# Patient Record
Sex: FEMALE | Race: BLACK OR AFRICAN AMERICAN | Employment: PART TIME | URBAN - METROPOLITAN AREA
[De-identification: names, ages, dates, MRNs, and addresses within clinical notes are randomized per-mention and may not be internally consistent; named-entity substitution may affect disease eponyms.]

---

## 2020-12-14 ENCOUNTER — OFFICE VISIT (OUTPATIENT)
Dept: PRIMARY CARE CLINIC | Age: 60
End: 2020-12-14

## 2020-12-14 VITALS
HEART RATE: 73 BPM | OXYGEN SATURATION: 96 % | SYSTOLIC BLOOD PRESSURE: 145 MMHG | DIASTOLIC BLOOD PRESSURE: 90 MMHG | TEMPERATURE: 98.5 F | BODY MASS INDEX: 26.84 KG/M2 | WEIGHT: 167 LBS | RESPIRATION RATE: 16 BRPM | HEIGHT: 66 IN

## 2020-12-14 DIAGNOSIS — H40.1130 PRIMARY OPEN ANGLE GLAUCOMA OF BOTH EYES, UNSPECIFIED GLAUCOMA STAGE: ICD-10-CM

## 2020-12-14 DIAGNOSIS — I10 ESSENTIAL HYPERTENSION: Primary | ICD-10-CM

## 2020-12-14 DIAGNOSIS — F51.01 PRIMARY INSOMNIA: ICD-10-CM

## 2020-12-14 DIAGNOSIS — R51.9 FREQUENT HEADACHES: ICD-10-CM

## 2020-12-14 PROCEDURE — 99203 OFFICE O/P NEW LOW 30 MIN: CPT | Performed by: INTERNAL MEDICINE

## 2020-12-14 RX ORDER — LATANOPROST 50 UG/ML
1 SOLUTION/ DROPS OPHTHALMIC
COMMUNITY

## 2020-12-14 RX ORDER — AMLODIPINE AND BENAZEPRIL HYDROCHLORIDE 5; 20 MG/1; MG/1
1 CAPSULE ORAL DAILY
COMMUNITY
End: 2020-12-14 | Stop reason: ALTCHOICE

## 2020-12-14 RX ORDER — LANOLIN ALCOHOL/MO/W.PET/CERES
CREAM (GRAM) TOPICAL
COMMUNITY

## 2020-12-14 RX ORDER — PROPRANOLOL HYDROCHLORIDE 10 MG/1
10 TABLET ORAL 3 TIMES DAILY
Qty: 90 TAB | Refills: 0 | Status: SHIPPED | OUTPATIENT
Start: 2020-12-14 | End: 2021-01-13

## 2020-12-14 NOTE — PROGRESS NOTES
Room: 3    Identified pt with two pt identifiers(name and ). Reviewed record in preparation for visit and have obtained necessary documentation. All patient medications has been reviewed. Chief Complaint   Patient presents with   [address] [facility] Blood Pressure Check       Health Maintenance Due   Topic    Hepatitis C Screening     DTaP/Tdap/Td series (1 - Tdap)    Lipid Screen     Shingrix Vaccine Age 50> (1 of 2)    Colorectal Cancer Screening Combo     Breast Cancer Screen Mammogram     PAP AKA CERVICAL CYTOLOGY     Flu Vaccine (1)     Pap: referral     Mammo: referral     Colorectal screening: overdue     Shingrix: within last year     Tdap: within last year     Flu: decline     Last pcp: Dr. Kyle Lira:    20 1151   BP: (!) 147/81   Pulse: 73   Resp: 16   Temp: 98.5 °F (36.9 °C)   TempSrc: Oral   SpO2: 96%   Weight: 167 lb (75.8 kg)   Height: 5' 6\" (1.676 m)   PainSc:   0 - No pain       4. Have you been to the ER, urgent care clinic since your last visit? Hospitalized since your last visit? No    5. Have you seen or consulted any other health care providers outside of the [address] [place] [address] since your last visit? Include any pap smears or colon screening. No    6. Would you like to receive your flu shot today? NO      Patient is accompanied by self I have received verbal consent from Mikki López to discuss any/all medical information while they are present in the room.

## 2020-12-14 NOTE — PROGRESS NOTES
Written by De Faye, as dictated by Dr. Ashley García MD.    Arabella Gilliland is a 62 y.o. female. HPI  Pt presents today for a BP check. She is taking Lotrel every day, and she has a HA every day. She started Lotrel in early 2019 and switched to lisinopril at the end of 2019 because it felt like Lotrel was too long. She is back on Lotrel now. She has strong fhx of hypertension. Pt's BP is elevated today in office at 147/81, 145/90 on manual repeat in L arm while sitting down. She has recently started going to a gym and working out, doing a squats and burpees and other exercises like this. She inquires if this is safe for her to do if she has high BP and a HA. She has no h/o migraines, and she gets a sharp, pulsing pain in her head that sometimes radiates down into her shoulders and chest. Her HA began in the summer and are not relieved by OTC pain medications. She carries a lot of responsibilities following her mother's death and she works three jobs so she has worries that keep her up at night. She is unsure if this is the source of her high BP and HA or if something else is. She tried melatonin without any difference. She has no h/o cholesterol or anemia problems. She does not have insurance at this time but will have it starting in January. She has glaucoma in her B/L eyes and follows up with her ophthalmologist twice a year. Patient Active Problem List   Diagnosis Code    Primary open angle glaucoma (POAG) of both eyes H40.1130    Essential hypertension I10        Current Outpatient Medications on File Prior to Visit   Medication Sig Dispense Refill    latanoprost (XALATAN) 0.005 % ophthalmic solution Administer 1 Drop to both eyes nightly.  elderberry fruit (ELDERBERRY PO) Take  by mouth.  OTHER seamoss      BURDOCK ROOT PO Take  by mouth.       OTHER Chaga mushroom      ferrous sulfate 325 mg (65 mg iron) tablet Take  by mouth Daily (before breakfast).  [DISCONTINUED] amLODIPine-benazepril (LOTREL) 5-20 mg per capsule Take 1 Cap by mouth daily.  multivitamin (ONE A DAY) tablet Take 1 Tab by mouth daily.  [DISCONTINUED] lisinopril (PRINIVIL, ZESTRIL) 2.5 mg tablet Take 2.5 mg by mouth daily. No current facility-administered medications on file prior to visit.         Allergies   Allergen Reactions    Bactrim [Sulfamethoprim] Nausea and Vomiting       Past Medical History:   Diagnosis Date    Glaucoma     Hypertension        Past Surgical History:   Procedure Laterality Date    HX GYN      hysterectomy    HX HEENT      tonsillectomy    HX TONSILLECTOMY  1967       Family History   Problem Relation Age of Onset    Parkinson's Disease Mother     Emphysema Father     Panic disorder Maternal Grandfather     Alcohol abuse Paternal Grandfather        Social History     Socioeconomic History    Marital status: SINGLE     Spouse name: Not on file    Number of children: Not on file    Years of education: Not on file    Highest education level: Not on file   Occupational History    Not on file   Social Needs    Financial resource strain: Not on file    Food insecurity     Worry: Not on file     Inability: Not on file    Transportation needs     Medical: Not on file     Non-medical: Not on file   Tobacco Use    Smoking status: Never Smoker    Smokeless tobacco: Never Used   Substance and Sexual Activity    Alcohol use: No    Drug use: No    Sexual activity: Not on file   Lifestyle    Physical activity     Days per week: Not on file     Minutes per session: Not on file    Stress: Not on file   Relationships    Social connections     Talks on phone: Not on file     Gets together: Not on file     Attends Scientologist service: Not on file     Active member of club or organization: Not on file     Attends meetings of clubs or organizations: Not on file     Relationship status: Not on file    Intimate partner violence Fear of current or ex partner: Not on file     Emotionally abused: Not on file     Physically abused: Not on file     Forced sexual activity: Not on file   Other Topics Concern    Not on file   Social History Narrative    Not on file       No visits with results within 3 Month(s) from this visit. Latest known visit with results is:   Admission on 04/07/2014, Discharged on 04/08/2014   Component Date Value Ref Range Status    Ventricular Rate 04/07/2014 69  BPM Final    Atrial Rate 04/07/2014 69  BPM Final    P-R Interval 04/07/2014 156  ms Final    QRS Duration 04/07/2014 64  ms Final    Q-T Interval 04/07/2014 388  ms Final    QTC Calculation (Bezet) 04/07/2014 415  ms Final    Calculated P Axis 04/07/2014 37  degrees Final    Calculated R Axis 04/07/2014 -26  degrees Final    Calculated T Axis 04/07/2014 -4  degrees Final    Diagnosis 04/07/2014    Final                    Value:Normal sinus rhythm                          Poor R-wave Progression (consider lead placement or loss of anterior forces)                          No previous ECGs available                          Confirmed by [facility], MD, Tor Artist ([id number]) on 4/8/2014 10:33:57 AM    WBC 04/07/2014 7.9  3.6 - 11.0 K/uL Final    RBC 04/07/2014 4.13  3.80 - 5.20 M/uL Final    HGB 04/07/2014 11.9  11.5 - 16.0 g/dL Final    HCT 04/07/2014 36.9  35.0 - 47.0 % Final    MCV 04/07/2014 89.3  80.0 - 99.0 FL Final    MCH 04/07/2014 28.8  26.0 - 34.0 PG Final    MCHC 04/07/2014 32.2  30.0 - 36.5 g/dL Final    RDW 04/07/2014 14.0  11.5 - 14.5 % Final    PLATELET 65/75/5851 589  150 - 400 K/uL Final    NEUTROPHILS 04/07/2014 44  32 - 75 % Final    LYMPHOCYTES 04/07/2014 46  12 - 49 % Final    MONOCYTES 04/07/2014 7  5 - 13 % Final    EOSINOPHILS 04/07/2014 3  0 - 7 % Final    BASOPHILS 04/07/2014 0  0 - 1 % Final    ABS. NEUTROPHILS 04/07/2014 3.5  1.8 - 8.0 K/UL Final    ABS. LYMPHOCYTES 04/07/2014 3.7* 0.8 - 3.5 K/UL Final    ABS. MONOCYTES 04/07/2014 0.5  0.0 - 1.0 K/UL Final    ABS. EOSINOPHILS 04/07/2014 0.2  0.0 - 0.4 K/UL Final    ABS. BASOPHILS 04/07/2014 0.0  0.0 - 0.1 K/UL Final    Sodium 04/07/2014 142  136 - 145 mmol/L Final    Potassium 04/07/2014 3.6  3.5 - 5.1 mmol/L Final    Chloride 04/07/2014 105  97 - 108 mmol/L Final    CO2 04/07/2014 29  21 - 32 mmol/L Final    Anion gap 04/07/2014 8  5 - 15 mmol/L Final    Glucose 04/07/2014 85  65 - 100 mg/dL Final    BUN 04/07/2014 22* 6 - 20 MG/DL Final    Creatinine 04/07/2014 0.99  0.45 - 1.15 MG/DL Final    BUN/Creatinine ratio 04/07/2014 22* 12 - 20   Final    GFR est AA 04/07/2014 >60  >60 ml/min/1.73m2 Final    GFR est non-AA 04/07/2014 59* >60 ml/min/1.73m2 Final    Comment: Estimated GFR is calculated using the IDMS-traceable Modification of Diet in                            Renal Disease (MDRD) Study equation, reported for both  Americans                            (GFRAA) and non- Americans (GFRNA), and normalized to 1.73m2 body                            surface area. The physician must decide which value applies to the patient. The MDRD study equation should only be used in individuals age 25 or older. It                            has not been validated for the following: pregnant women, patients with                            serious comorbid conditions, or on certain medications, or persons with                            extremes of body size, muscle mass, or nutritional status.  Calcium 04/07/2014 9.3  8.5 - 10.1 MG/DL Final    Bilirubin, total 04/07/2014 0.3  0.2 - 1.0 MG/DL Final    ALT (SGPT) 04/07/2014 22  12 - 78 U/L Final    AST (SGOT) 04/07/2014 14* 15 - 37 U/L Final    Alk.  phosphatase 04/07/2014 96  50 - 136 U/L Final    Protein, total 04/07/2014 8.0  6.4 - 8.2 g/dL Final    Albumin 04/07/2014 3.9  3.5 - 5.0 g/dL Final    Globulin 04/07/2014 4.1* 2.0 - 4.0 g/dL Final    A-G Ratio 04/07/2014 1.0* 1.1 - 2.2   Final    CK 04/07/2014 132  26 - 192 U/L Final    NOT ELEVATED,CKMB-QUANT NOT PERFORMED    Troponin-I, Qt. 04/07/2014 <0.04  <0.05 ng/mL Final    Comment: The presence of detectable troponin above the reference range indicates                            myocardial injury which may be due to ischemia, myocarditis, trauma, etc.                           Clinical correlation is necessary to establish the significance of this                            finding. Sequential testing is recommended to determine if the typical rise and fall of                            cTnI is demonstrated. Note:  Cardiac troponin I has a relatively long half life and may be present                            well after the CK MB has returned to baseline. The reference range is based on the 99th percentile of the referent                            population.  INR 04/07/2014 1.1  0.9 - 1.1   Final    Comment: A single therapeutic range for Vit K antagonists may not be optimal for all                            indications - see June, 2008 issue of Chest, American College of Chest                            Physicians Evidence-Based Clinical Practice Guidelines, 8th Edition.     Prothrombin time 04/07/2014 11.1  9.4 - 11.7 sec Final    aPTT 04/07/2014 27.2  23.0 - 30.0 sec Final    Comment: In addition to factor deficiency, monitoring heparin therapy, etc., evaluation                            of a prolonged aPTT result should include consideration of preanalytic                            variables such as heparin flush contamination, specimen integrity issues, etc.    aPTT, therapeutic range 04/07/2014      58.0 - 77.0 SECS Final    Color 04/08/2014 YELLOW/STRAW   Final    Color Reference Range: Straw, Yellow or Dark Yellow    Appearance 04/08/2014 CLEAR  CLEAR Final    Specific gravity 04/08/2014 1.023  1.003 - 1.030   Final    pH (UA) 04/08/2014 6.0  5.0 - 8.0   Final    Protein 04/08/2014 NEGATIVE   NEGATIVE mg/dL Final    Glucose 04/08/2014 NEGATIVE   NEGATIVE mg/dL Final    Ketone 04/08/2014 NEGATIVE   NEGATIVE mg/dL Final    Bilirubin 04/08/2014 NEGATIVE   NEGATIVE Final    Blood 04/08/2014 NEGATIVE   NEGATIVE Final    Urobilinogen 04/08/2014 0.2  0.2 - 1.0 EU/dL Final    Nitrites 04/08/2014 NEGATIVE   NEGATIVE Final    Leukocyte Esterase 04/08/2014 TRACE* NEGATIVE Final    UA:UC IF INDICATED 04/08/2014 CULTURE NOT INDICATED BY UA RESULT  CULTURE NOT INDICATE Final    WBC 04/08/2014 0-4  0 - 4 /hpf Final    RBC 04/08/2014 0-5  0 - 5 /hpf Final    Epithelial cells 04/08/2014 FEW  FEW /lpf Final    Comment: Epithelial cell category consists of squamous cells and /or transitional                            urothelial cells. Renal tubular cells, if present, are separately identified                            as such.  Bacteria 04/08/2014 NEGATIVE   NEGATIVE /hpf Final    Hyaline cast 04/08/2014 0-2  0 - 2 Final     Review of Systems   Constitutional: Negative for malaise/fatigue and weight loss. HENT: Negative for congestion and sore throat. Eyes: Negative for blurred vision. Respiratory: Negative for cough and shortness of breath. Cardiovascular: Negative for chest pain and leg swelling. Gastrointestinal: Negative for constipation and heartburn. Genitourinary: Negative for frequency and urgency. Musculoskeletal: Negative for back pain, joint pain and myalgias. Neurological: Positive for headaches. Negative for dizziness. Psychiatric/Behavioral: Negative for depression. The patient has insomnia. The patient is not nervous/anxious. Visit Vitals  BP (!) 145/90 (BP 1 Location: Left arm, BP Patient Position: Sitting)   Pulse 73   Temp 98.5 °F (36.9 °C) (Oral)   Resp 16   Ht 5' 6\" (1.676 m)   Wt 167 lb (75.8 kg)   SpO2 96%   BMI 26.95 kg/m²     Physical Exam  Vitals signs and nursing note reviewed. Constitutional:       General: She is not in acute distress. Appearance: Normal appearance. She is well-developed and well-groomed. She is not diaphoretic. HENT:      Right Ear: External ear normal.      Left Ear: External ear normal.   Eyes:      General: No scleral icterus. Right eye: No discharge. Left eye: No discharge. Extraocular Movements: Extraocular movements intact. Neck:      Musculoskeletal: Normal range of motion and neck supple. Cardiovascular:      Rate and Rhythm: Normal rate and regular rhythm. Pulmonary:      Effort: Pulmonary effort is normal.      Breath sounds: Normal breath sounds. No wheezing. Musculoskeletal:      Right lower leg: No edema. Left lower leg: No edema. Lymphadenopathy:      Cervical: No cervical adenopathy. Neurological:      Mental Status: She is alert and oriented to person, place, and time. Psychiatric:         Mood and Affect: Mood and affect normal.         Behavior: Behavior normal.       ASSESSMENT and PLAN    ICD-10-CM ICD-9-CM    1. Essential hypertension  I10 401.9 propranoloL (INDERAL) 10 mg tablet sent to pharmacy. Potential side effects were discussed. I started her on propranolol and explained that it will help for her BP and HA at the same time. I instructed her to take 10 mg BID. She should see her HA start to decrease and her BP start to come down within 5-6 days. If her BP is still running high, she can increase to TID. Explained that the possible sfx can include a slower heart rate or dizziness. 2. Frequent headaches  R51.9 784.0 propranoloL (INDERAL) 10 mg tablet sent to pharmacy. Potential side effects were discussed. I started her on propranolol and explained that it will help for her BP and HA at the same time. I instructed her to take 10 mg BID. She should see her HA start to decrease and her BP start to come down within 5-6 days. If her BP is still running high, she can increase to TID. Explained that the possible sfx can include a slower heart rate or dizziness. 3. Primary open angle glaucoma of both eyes, unspecified glaucoma stage  H40.1130 365.11 Pt follows with ophthalmology. 365.70    4. Primary insomnia  F51.01 307.42 I suggested she start with home remedies for sleep such as Valerian root, tart cherry juice, or Unisom for her insomnia. We discussed that the evening dose of propranolol will help some too. Additional comments: I instructed her not to do intense workouts when she has     This plan was reviewed with the patient and patient agrees. All questions were answered. This scribe documentation was reviewed by me and accurately reflects the examination and decisions made by me.

## 2020-12-29 ENCOUNTER — OFFICE VISIT (OUTPATIENT)
Dept: PRIMARY CARE CLINIC | Age: 60
End: 2020-12-29

## 2020-12-29 VITALS
RESPIRATION RATE: 18 BRPM | BODY MASS INDEX: 27.03 KG/M2 | OXYGEN SATURATION: 97 % | SYSTOLIC BLOOD PRESSURE: 136 MMHG | WEIGHT: 168.2 LBS | HEIGHT: 66 IN | DIASTOLIC BLOOD PRESSURE: 88 MMHG | TEMPERATURE: 97.8 F | HEART RATE: 63 BPM

## 2020-12-29 DIAGNOSIS — R51.9 FREQUENT HEADACHES: ICD-10-CM

## 2020-12-29 DIAGNOSIS — I10 ESSENTIAL HYPERTENSION: Primary | ICD-10-CM

## 2020-12-29 DIAGNOSIS — H40.1130 PRIMARY OPEN ANGLE GLAUCOMA OF BOTH EYES, UNSPECIFIED GLAUCOMA STAGE: ICD-10-CM

## 2020-12-29 DIAGNOSIS — M81.0 POSTMENOPAUSAL BONE LOSS: ICD-10-CM

## 2020-12-29 DIAGNOSIS — Z12.11 COLON CANCER SCREENING: ICD-10-CM

## 2020-12-29 DIAGNOSIS — Z12.31 ENCOUNTER FOR SCREENING MAMMOGRAM FOR MALIGNANT NEOPLASM OF BREAST: ICD-10-CM

## 2020-12-29 DIAGNOSIS — Z11.59 NEED FOR HEPATITIS C SCREENING TEST: ICD-10-CM

## 2020-12-29 PROCEDURE — 99214 OFFICE O/P EST MOD 30 MIN: CPT | Performed by: INTERNAL MEDICINE

## 2020-12-29 RX ORDER — ACETAZOLAMIDE 125 MG/1
250 TABLET ORAL 2 TIMES DAILY
Qty: 120 TAB | Refills: 0 | Status: SHIPPED | OUTPATIENT
Start: 2020-12-29 | End: 2021-03-03

## 2020-12-29 RX ORDER — AMLODIPINE AND BENAZEPRIL HYDROCHLORIDE 5; 20 MG/1; MG/1
1 CAPSULE ORAL DAILY
COMMUNITY
End: 2021-03-22 | Stop reason: ALTCHOICE

## 2020-12-29 NOTE — PROGRESS NOTES
Written by Cass Johnston, as dictated by Dr. Tori Martinez MD.    Altagracia Frost is a 62 y.o. female. HPI  Pt presents today for a BP check. Her BP looks good in office today at 131/87 and she is taking Lotrel 5-20 mg every day but forgot to take it this morning. However, she is still getting HA frequently. She gets her glaucoma checked every 6 months, and the pressure in her eyes was 12 and 13 last time she went. She has not discussed Diamox with her ophthalmologist yet. She had her mammogram done this year, but she had it done at [facility]. She had a total hysterectomy at the age of 33 and has not had a menstrual cycle since then. She follows with a gynecologist but has never had a DEXA scan. Her has colonoscopy was 10 years ago, and she is due for her next one. Patient Active Problem List   Diagnosis Code    Primary open angle glaucoma (POAG) of both eyes H40.1130    Essential hypertension I10        Current Outpatient Medications on File Prior to Visit   Medication Sig Dispense Refill    amLODIPine-benazepril (LOTREL) 5-20 mg per capsule Take 1 Cap by mouth daily.  latanoprost (XALATAN) 0.005 % ophthalmic solution Administer 1 Drop to both eyes nightly.  elderberry fruit (ELDERBERRY PO) Take  by mouth.  OTHER seamoss      BURDOCK ROOT PO Take  by mouth.  OTHER Chaga mushroom      ferrous sulfate 325 mg (65 mg iron) tablet Take  by mouth Daily (before breakfast).  multivitamin (ONE A DAY) tablet Take 1 Tab by mouth daily.  propranoloL (INDERAL) 10 mg tablet Take 1 Tab by mouth three (3) times daily for 30 days. 90 Tab 0     No current facility-administered medications on file prior to visit.         Allergies   Allergen Reactions    Bactrim [Sulfamethoprim] Nausea and Vomiting       Past Medical History:   Diagnosis Date    Glaucoma     Hypertension        Past Surgical History:   Procedure Laterality Date    HX GYN      hysterectomy    HX HEENT      tonsillectomy    HX TONSILLECTOMY  1967       Family History   Problem Relation Age of Onset    Parkinson's Disease Mother     Emphysema Father     Panic disorder Maternal Grandfather     Alcohol abuse Paternal Grandfather        Social History     Socioeconomic History    Marital status: SINGLE     Spouse name: Not on file    Number of children: Not on file    Years of education: Not on file    Highest education level: Not on file   Occupational History    Not on file   Social Needs    Financial resource strain: Not on file    Food insecurity     Worry: Not on file     Inability: Not on file    Transportation needs     Medical: Not on file     Non-medical: Not on file   Tobacco Use    Smoking status: Never Smoker    Smokeless tobacco: Never Used   Substance and Sexual Activity    Alcohol use: No    Drug use: No    Sexual activity: Not on file   Lifestyle    Physical activity     Days per week: Not on file     Minutes per session: Not on file    Stress: Not on file   Relationships    Social connections     Talks on phone: Not on file     Gets together: Not on file     Attends Rastafarian service: Not on file     Active member of club or organization: Not on file     Attends meetings of clubs or organizations: Not on file     Relationship status: Not on file    Intimate partner violence     Fear of current or ex partner: Not on file     Emotionally abused: Not on file     Physically abused: Not on file     Forced sexual activity: Not on file   Other Topics Concern    Not on file   Social History Narrative    Not on file       No visits with results within 3 Month(s) from this visit.    Latest known visit with results is:   Admission on 04/07/2014, Discharged on 04/08/2014   Component Date Value Ref Range Status    Ventricular Rate 04/07/2014 69  BPM Final    Atrial Rate 04/07/2014 69  BPM Final    P-R Interval 04/07/2014 156  ms Final    QRS Duration 04/07/2014 64  ms Final    Q-T Interval 04/07/2014 388  ms Final    QTC Calculation (Bezet) 04/07/2014 415  ms Final    Calculated P Axis 04/07/2014 37  degrees Final    Calculated R Axis 04/07/2014 -26  degrees Final    Calculated T Axis 04/07/2014 -4  degrees Final    Diagnosis 04/07/2014    Final                    Value:Normal sinus rhythm                          Poor R-wave Progression (consider lead placement or loss of anterior forces)                          No previous ECGs available                          Confirmed by Bronwyn Cabrera MD, Krystal Woodson ([id number]) on 4/8/2014 10:33:57 AM    WBC 04/07/2014 7.9  3.6 - 11.0 K/uL Final    RBC 04/07/2014 4.13  3.80 - 5.20 M/uL Final    HGB 04/07/2014 11.9  11.5 - 16.0 g/dL Final    HCT 04/07/2014 36.9  35.0 - 47.0 % Final    MCV 04/07/2014 89.3  80.0 - 99.0 FL Final    MCH 04/07/2014 28.8  26.0 - 34.0 PG Final    MCHC 04/07/2014 32.2  30.0 - 36.5 g/dL Final    RDW 04/07/2014 14.0  11.5 - 14.5 % Final    PLATELET 13/96/4577 384  150 - 400 K/uL Final    NEUTROPHILS 04/07/2014 44  32 - 75 % Final    LYMPHOCYTES 04/07/2014 46  12 - 49 % Final    MONOCYTES 04/07/2014 7  5 - 13 % Final    EOSINOPHILS 04/07/2014 3  0 - 7 % Final    BASOPHILS 04/07/2014 0  0 - 1 % Final    ABS. NEUTROPHILS 04/07/2014 3.5  1.8 - 8.0 K/UL Final    ABS. LYMPHOCYTES 04/07/2014 3.7* 0.8 - 3.5 K/UL Final    ABS. MONOCYTES 04/07/2014 0.5  0.0 - 1.0 K/UL Final    ABS. EOSINOPHILS 04/07/2014 0.2  0.0 - 0.4 K/UL Final    ABS.  BASOPHILS 04/07/2014 0.0  0.0 - 0.1 K/UL Final    Sodium 04/07/2014 142  136 - 145 mmol/L Final    Potassium 04/07/2014 3.6  3.5 - 5.1 mmol/L Final    Chloride 04/07/2014 105  97 - 108 mmol/L Final    CO2 04/07/2014 29  21 - 32 mmol/L Final    Anion gap 04/07/2014 8  5 - 15 mmol/L Final    Glucose 04/07/2014 85  65 - 100 mg/dL Final    BUN 04/07/2014 22* 6 - 20 MG/DL Final    Creatinine 04/07/2014 0.99  0.45 - 1.15 MG/DL Final    BUN/Creatinine ratio 04/07/2014 22* 12 - 20   Final    GFR est AA 04/07/2014 >60  >60 ml/min/1.73m2 Final    GFR est non-AA 04/07/2014 59* >60 ml/min/1.73m2 Final    Comment: Estimated GFR is calculated using the IDMS-traceable Modification of Diet in                            Renal Disease (MDRD) Study equation, reported for both  Americans                            (GFRAA) and non- Americans (GFRNA), and normalized to 1.73m2 body                            surface area. The physician must decide which value applies to the patient. The MDRD study equation should only be used in individuals age 25 or older. It                            has not been validated for the following: pregnant women, patients with                            serious comorbid conditions, or on certain medications, or persons with                            extremes of body size, muscle mass, or nutritional status.  Calcium 04/07/2014 9.3  8.5 - 10.1 MG/DL Final    Bilirubin, total 04/07/2014 0.3  0.2 - 1.0 MG/DL Final    ALT (SGPT) 04/07/2014 22  12 - 78 U/L Final    AST (SGOT) 04/07/2014 14* 15 - 37 U/L Final    Alk. phosphatase 04/07/2014 96  50 - 136 U/L Final    Protein, total 04/07/2014 8.0  6.4 - 8.2 g/dL Final    Albumin 04/07/2014 3.9  3.5 - 5.0 g/dL Final    Globulin 04/07/2014 4.1* 2.0 - 4.0 g/dL Final    A-G Ratio 04/07/2014 1.0* 1.1 - 2.2   Final    CK 04/07/2014 132  26 - 192 U/L Final    NOT ELEVATED,CKMB-QUANT NOT PERFORMED    Troponin-I, Qt. 04/07/2014 <0.04  <0.05 ng/mL Final    Comment: The presence of detectable troponin above the reference range indicates                            myocardial injury which may be due to ischemia, myocarditis, trauma, etc.                           Clinical correlation is necessary to establish the significance of this                            finding.                            Sequential testing is recommended to determine if the typical rise and fall of                            cTnI is demonstrated. Note:  Cardiac troponin I has a relatively long half life and may be present                            well after the CK MB has returned to baseline. The reference range is based on the 99th percentile of the referent                            population.  INR 04/07/2014 1.1  0.9 - 1.1   Final    Comment: A single therapeutic range for Vit K antagonists may not be optimal for all                            indications - see June, 2008 issue of Chest, American College of Chest                            Physicians Evidence-Based Clinical Practice Guidelines, 8th Edition.     Prothrombin time 04/07/2014 11.1  9.4 - 11.7 sec Final    aPTT 04/07/2014 27.2  23.0 - 30.0 sec Final    Comment: In addition to factor deficiency, monitoring heparin therapy, etc., evaluation                            of a prolonged aPTT result should include consideration of preanalytic                            variables such as heparin flush contamination, specimen integrity issues, etc.    aPTT, therapeutic range 04/07/2014      58.0 - 77.0 SECS Final    Color 04/08/2014 YELLOW/STRAW   Final    Color Reference Range: Straw, Yellow or Dark Yellow    Appearance 04/08/2014 CLEAR  CLEAR Final    Specific gravity 04/08/2014 1.023  1.003 - 1.030   Final    pH (UA) 04/08/2014 6.0  5.0 - 8.0   Final    Protein 04/08/2014 NEGATIVE   NEGATIVE mg/dL Final    Glucose 04/08/2014 NEGATIVE   NEGATIVE mg/dL Final    Ketone 04/08/2014 NEGATIVE   NEGATIVE mg/dL Final    Bilirubin 04/08/2014 NEGATIVE   NEGATIVE Final    Blood 04/08/2014 NEGATIVE   NEGATIVE Final    Urobilinogen 04/08/2014 0.2  0.2 - 1.0 EU/dL Final    Nitrites 04/08/2014 NEGATIVE   NEGATIVE Final    Leukocyte Esterase 04/08/2014 TRACE* NEGATIVE Final    UA:UC IF INDICATED 04/08/2014 CULTURE NOT INDICATED BY UA RESULT  CULTURE NOT INDICATE Final    WBC 04/08/2014 0-4  0 - 4 /hpf Final    RBC 04/08/2014 0-5  0 - 5 /hpf Final    Epithelial cells 04/08/2014 FEW  FEW /lpf Final    Comment: Epithelial cell category consists of squamous cells and /or transitional                            urothelial cells. Renal tubular cells, if present, are separately identified                            as such.  Bacteria 04/08/2014 NEGATIVE   NEGATIVE /hpf Final    Hyaline cast 04/08/2014 0-2  0 - 2 Final     Review of Systems   Constitutional: Negative for malaise/fatigue and weight loss. HENT: Negative for congestion and sore throat. Eyes: Negative for blurred vision. Respiratory: Negative for cough and shortness of breath. Cardiovascular: Negative for chest pain and leg swelling. Gastrointestinal: Negative for constipation and heartburn. Genitourinary: Negative for frequency and urgency. Musculoskeletal: Negative for back pain, joint pain and myalgias. Neurological: Positive for headaches (frequent). Negative for dizziness. Psychiatric/Behavioral: Negative for depression. The patient is not nervous/anxious and does not have insomnia. Visit Vitals  /88 (BP 1 Location: Right arm, BP Patient Position: Sitting)   Pulse 63   Temp 97.8 °F (36.6 °C) (Temporal)   Resp 18   Ht 5' 6\" (1.676 m)   Wt 168 lb 3.2 oz (76.3 kg)   SpO2 97%   BMI 27.15 kg/m²     Physical Exam  Vitals signs and nursing note reviewed. Constitutional:       General: She is not in acute distress. Appearance: Normal appearance. She is well-developed and well-groomed. She is not diaphoretic. HENT:      Right Ear: External ear normal.      Left Ear: External ear normal.   Eyes:      General: No scleral icterus. Right eye: No discharge. Left eye: No discharge. Extraocular Movements: Extraocular movements intact. Neck:      Musculoskeletal: Normal range of motion and neck supple. Cardiovascular:      Rate and Rhythm: Normal rate and regular rhythm.    Pulmonary: Effort: Pulmonary effort is normal.      Breath sounds: Normal breath sounds. No wheezing. Musculoskeletal:      Right lower leg: No edema. Left lower leg: No edema. Lymphadenopathy:      Cervical: No cervical adenopathy. Neurological:      Mental Status: She is alert and oriented to person, place, and time. Psychiatric:         Mood and Affect: Mood and affect normal.         Behavior: Behavior normal.       ASSESSMENT and PLAN    ICD-10-CM ICD-9-CM    1. Essential hypertension  Q59 513.9 METABOLIC PANEL, COMPREHENSIVE      CBC W/O DIFF      LIPID PANEL    Ordered fasting labs for pt to complete today in office. Waiting on results. acetaZOLAMIDE (DIAMOX) 125 mg tablet sent to pharmacy. Potential side effects were discussed. I instructed her to take 1 tablet BID first and see how her BP is and how she is feeling. If her BP is running high, she can increase to taking 2 tablets BID. Discontinue Lotrel. 2. Primary open angle glaucoma of both eyes, unspecified glaucoma stage  H40.1130 365.11 acetaZOLAMIDE (DIAMOX) 125 mg tablet sent to pharmacy. Potential side effects were discussed. We discussed that Diamox helps with both BP and glaucoma. I instructed her to contact her ophthalmologist to let them know about this new medication. 365.70    3. Frequent headaches  R51.9 784.0 SED RATE (ESR)      BENNY, DIRECT, W/REFLEX      acetaZOLAMIDE (DIAMOX) 125 mg tablet sent to pharmacy. Potential side effects were discussed. We discussed that Diamox may help her HA if they are coming from intracranial pressure. I ordered labs to evaluate for other underlying causes as well. 4. Need for hepatitis C screening test  Z11.59 V73.89 HEPATITIS C AB    Labs drawn in office today. 5. Encounter for screening mammogram for malignant neoplasm of breast  Z12.31 V76.12 [facility] 3D VICTORIANO W MAMMO BI SCREENING INCL CAD    I ordered a mammogram for health maintenance.      6. Postmenopausal bone loss M81.0 733.01 DEXA BONE DENSITY STUDY AXIAL    I ordered a DEXA scan for health maintenance. 7. Colon cancer screening  Z12.11 V76.51 REFERRAL TO GASTROENTEROLOGY    I provided a referral to gastroenterology for her colonoscopy. This plan was reviewed with the patient and patient agrees. All questions were answered. This scribe documentation was reviewed by me and accurately reflects the examination and decisions made by me.

## 2021-01-13 DIAGNOSIS — M34.9 SCLERODERMA (HCC): Primary | ICD-10-CM

## 2021-03-03 DIAGNOSIS — R51.9 FREQUENT HEADACHES: ICD-10-CM

## 2021-03-03 DIAGNOSIS — I10 ESSENTIAL HYPERTENSION: ICD-10-CM

## 2021-03-03 DIAGNOSIS — H40.1130 PRIMARY OPEN ANGLE GLAUCOMA OF BOTH EYES, UNSPECIFIED GLAUCOMA STAGE: ICD-10-CM

## 2021-03-03 RX ORDER — ACETAZOLAMIDE 125 MG/1
TABLET ORAL
Qty: 120 TAB | Refills: 0 | Status: SHIPPED | OUTPATIENT
Start: 2021-03-03 | End: 2021-03-04

## 2021-03-04 DIAGNOSIS — I10 ESSENTIAL HYPERTENSION: ICD-10-CM

## 2021-03-04 DIAGNOSIS — R51.9 FREQUENT HEADACHES: ICD-10-CM

## 2021-03-04 DIAGNOSIS — H40.1130 PRIMARY OPEN ANGLE GLAUCOMA OF BOTH EYES, UNSPECIFIED GLAUCOMA STAGE: ICD-10-CM

## 2021-03-04 RX ORDER — ACETAZOLAMIDE 125 MG/1
TABLET ORAL
Qty: 120 TAB | Refills: 0 | Status: SHIPPED | OUTPATIENT
Start: 2021-03-04 | End: 2021-06-30

## 2021-03-22 ENCOUNTER — OFFICE VISIT (OUTPATIENT)
Dept: RHEUMATOLOGY | Age: 61
End: 2021-03-22

## 2021-03-22 VITALS
RESPIRATION RATE: 18 BRPM | TEMPERATURE: 96.8 F | BODY MASS INDEX: 27 KG/M2 | SYSTOLIC BLOOD PRESSURE: 155 MMHG | DIASTOLIC BLOOD PRESSURE: 99 MMHG | HEIGHT: 66 IN | WEIGHT: 168 LBS | HEART RATE: 61 BPM

## 2021-03-22 DIAGNOSIS — Z72.820 POOR SLEEP: ICD-10-CM

## 2021-03-22 DIAGNOSIS — R76.8 FALSE POSITIVE ANA: Primary | ICD-10-CM

## 2021-03-22 DIAGNOSIS — G44.219 EPISODIC TENSION-TYPE HEADACHE, NOT INTRACTABLE: ICD-10-CM

## 2021-03-22 DIAGNOSIS — M54.2 MUSCULOSKELETAL NECK PAIN: ICD-10-CM

## 2021-03-22 PROCEDURE — 99204 OFFICE O/P NEW MOD 45 MIN: CPT | Performed by: INTERNAL MEDICINE
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
## 2021-03-22 NOTE — LETTER
3/22/2021 Patient: Lois Velazquez YOB: 1960 Date of Visit: 3/22/2021 Marykay Bence, MD 
[address] [place] [id number] [address] [employer] Dear Marykay Bence, MD, Thank you for referring Ms. Chasity Juárez to [facility] for evaluation. My notes for this consultation are attached. If you have questions, please do not hesitate to call me. I look forward to following your patient along with you.  
 
 
Sincerely, 
 
Radha Herrera MD

## 2021-03-22 NOTE — PATIENT INSTRUCTIONS
Poor Sleep. Please discuss with Dr. Darío Palacios to assist you with your sleep issues. Musculoskeletal Neck Pain. The etiology is usually from chronic slouching due to reading, smart phone use, video onel, and/or computer work. This will cause muscular tension and spasms, which may result with referred headaches. This is not an inflammatory process nor is it an immune mediated condition. It is mechanical and should be treated with targeted physical therapy with the goal of realigning the neck and shoulder girdle into the normal anatomic alignment. Muscle relaxants and analgesics should be used as supportive treatment. I recommend physical therapy. Neck: Exercises Introduction Here are some examples of exercises for you to try. The exercises may be suggested for a condition or for rehabilitation. Start each exercise slowly. Ease off the exercises if you start to have pain. You will be told when to start these exercises and which ones will work best for you. How to do the exercises Neck stretch 1. This stretch works best if you keep your shoulder down as you lean away from it. To help you remember to do this, start by relaxing your shoulders and lightly holding on to your thighs or your chair. 2. Tilt your head toward your shoulder and hold for 15 to 30 seconds. Let the weight of your head stretch your muscles. 3. If you would like a little added stretch, use your hand to gently and steadily pull your head toward your shoulder. For example, keeping your right shoulder down, lean your head to the left. 4. Repeat 2 to 4 times toward each shoulder. Diagonal neck stretch 1. Turn your head slightly toward the direction you will be stretching, and tilt your head diagonally toward your chest and hold for 15 to 30 seconds. 2. If you would like a little added stretch, use your hand to gently and steadily pull your head forward on the diagonal. 
3. Repeat 2 to 4 times toward each side.    
Dorsal glide stretch The dorsal glide stretches the back of the neck. If you feel pain, do not glide so far back. Some people find this exercise easier to do while lying on their backs with an ice pack on the neck. 1. Sit or stand tall and look straight ahead. 2. Slowly tuck your chin as you glide your head backward over your body 3. Hold for a count of 6, and then relax for up to 10 seconds. 4. Repeat 8 to 12 times. Chest and shoulder stretch 1. Sit or stand tall and glide your head backward as in the dorsal glide stretch. 2. Raise both arms so that your hands are next to your ears. 3. Take a deep breath, and as you breathe out, lower your elbows down and behind your back. You will feel your shoulder blades slide down and together, and at the same time you will feel a stretch across your chest and the front of your shoulders. 4. Hold for about 6 seconds, and then relax for up to 10 seconds. 5. Repeat 8 to 12 times. Strengthening: Hands on head 1. Move your head backward, forward, and side to side against gentle pressure from your hands, holding each position for about 6 seconds. 2. Repeat 8 to 12 times. Follow-up care is a key part of your treatment and safety. Be sure to make and go to all appointments, and call your doctor if you are having problems. It's also a good idea to know your test results and keep a list of the medicines you take. Where can you learn more? Go to http://www.[employer].com/ Enter P975 in the search box to learn more about \"Neck: Exercises. \" Current as of: March 2, 2020               Content Version: 12.6 © 3140-9507 [employer]. Care instructions adapted under license by [employer] (which disclaims liability or warranty for this information).  If you have questions about a medical condition or this instruction, always ask your healthcare professional. [address] any warranty or liability for your use of this information.

## 2021-06-30 DIAGNOSIS — R51.9 FREQUENT HEADACHES: ICD-10-CM

## 2021-06-30 DIAGNOSIS — H40.1130 PRIMARY OPEN ANGLE GLAUCOMA OF BOTH EYES, UNSPECIFIED GLAUCOMA STAGE: ICD-10-CM

## 2021-06-30 DIAGNOSIS — I10 ESSENTIAL HYPERTENSION: ICD-10-CM

## 2021-06-30 RX ORDER — ACETAZOLAMIDE 125 MG/1
TABLET ORAL
Qty: 120 TABLET | Refills: 0 | Status: SHIPPED | OUTPATIENT
Start: 2021-06-30 | End: 2021-08-09

## 2021-08-09 DIAGNOSIS — H40.1130 PRIMARY OPEN ANGLE GLAUCOMA OF BOTH EYES, UNSPECIFIED GLAUCOMA STAGE: ICD-10-CM

## 2021-08-09 DIAGNOSIS — I10 ESSENTIAL HYPERTENSION: ICD-10-CM

## 2021-08-09 DIAGNOSIS — R51.9 FREQUENT HEADACHES: ICD-10-CM

## 2021-08-09 RX ORDER — ACETAZOLAMIDE 125 MG/1
TABLET ORAL
Qty: 120 TABLET | Refills: 0 | Status: SHIPPED | OUTPATIENT
Start: 2021-08-09 | End: 2021-09-10

## 2021-10-15 DIAGNOSIS — H40.1130 PRIMARY OPEN ANGLE GLAUCOMA OF BOTH EYES, UNSPECIFIED GLAUCOMA STAGE: ICD-10-CM

## 2021-10-15 DIAGNOSIS — R51.9 FREQUENT HEADACHES: ICD-10-CM

## 2021-10-15 DIAGNOSIS — I10 ESSENTIAL HYPERTENSION: ICD-10-CM

## 2021-10-15 RX ORDER — ACETAZOLAMIDE 125 MG/1
TABLET ORAL
Qty: 120 TABLET | Refills: 0 | Status: SHIPPED | OUTPATIENT
Start: 2021-10-15 | End: 2021-11-16

## 2021-10-25 ENCOUNTER — OFFICE VISIT (OUTPATIENT)
Dept: PRIMARY CARE CLINIC | Age: 61
End: 2021-10-25

## 2021-10-25 VITALS
WEIGHT: 163.2 LBS | HEART RATE: 55 BPM | TEMPERATURE: 97.3 F | DIASTOLIC BLOOD PRESSURE: 82 MMHG | OXYGEN SATURATION: 99 % | RESPIRATION RATE: 15 BRPM | HEIGHT: 66 IN | SYSTOLIC BLOOD PRESSURE: 140 MMHG | BODY MASS INDEX: 26.23 KG/M2

## 2021-10-25 DIAGNOSIS — Z86.69 HISTORY OF GLAUCOMA: ICD-10-CM

## 2021-10-25 DIAGNOSIS — N95.0 POSTMENOPAUSAL VAGINAL BLEEDING: ICD-10-CM

## 2021-10-25 DIAGNOSIS — R51.9 FREQUENT HEADACHES: ICD-10-CM

## 2021-10-25 DIAGNOSIS — I10 UNCONTROLLED HYPERTENSION: Primary | ICD-10-CM

## 2021-10-25 PROCEDURE — 99214 OFFICE O/P EST MOD 30 MIN: CPT | Performed by: INTERNAL MEDICINE

## 2021-10-25 RX ORDER — AMLODIPINE BESYLATE 5 MG/1
5 TABLET ORAL DAILY
Qty: 30 TABLET | Refills: 2 | Status: SHIPPED | OUTPATIENT
Start: 2021-10-25 | End: 2022-01-15

## 2021-10-25 NOTE — PROGRESS NOTES
Chief Complaint   Patient presents with    Follow-up     BP follow up       Visit Vitals  BP (!) 145/83 (BP 1 Location: Left arm)   Pulse (!) 55   Temp 97.3 °F (36.3 °C)   Resp 15   Ht 5' 6\" (1.676 m)   Wt 163 lb 3.2 oz (74 kg)   SpO2 99%   BMI 26.34 kg/m²       1. Have you been to the ER, urgent care clinic since your last visit? Hospitalized since your last visit? No    2. Have you seen or consulted any other health care providers outside of the [address] [place] [address] since your last visit? Include any pap smears or colon screening.  Yes GYN- was given flucanazole because she thought she had infection, pain during sex

## 2021-10-25 NOTE — PROGRESS NOTES
Anup Lobato (: 1960) is a 62 y.o. female, established patient, here for evaluation of the following chief complaint(s):  Follow-up (BP follow up)     Written by Antoinette Riddle, as dictated by Dr. Waldemar Live MD.      ASSESSMENT/PLAN:  Below is the assessment and plan developed based on review of pertinent history, physical exam, labs, studies, and medications. 1. Uncontrolled hypertension  Continue taking Diamox 125 mg as prescribed. Prescribed amlodipine 5 mg, take 1 tab daily as prescribed. Potential side effects were discussed. -     amLODIPine (NORVASC) 5 mg tablet; Take 1 Tablet by mouth daily for 30 days. , Normal, Disp-30 Tablet, R-2 sent to pharmacy. 2. Frequent headaches  Secondary to uncontrolled hypertension. Continue taking Diamox 125 mg as prescribed. Prescribed amlodipine 5 mg, take 1 tab daily as prescribed. Potential side effects were discussed. -     amLODIPine (NORVASC) 5 mg tablet; Take 1 Tablet by mouth daily for 30 days. , Normal, Disp-30 Tablet, R-2 sent to pharmacy. 3. Postmenopausal vaginal bleeding  Advised her to make an appointment with her OBGYN in [place] to discuss vaginal bleeding. Explained postmenopausal bleeding is worrisome     4. History of glaucoma  Continue taking Diamox 125 mg as prescribed. Discussed with Ophthalmologist about headaches, could be due to Glaucoma. SUBJECTIVE/OBJECTIVE:  HPI    Patient presents today for a follow up visit. She takes Diamox 125 mg for HTN and hx of glaucoma. Her BP today is 145/83, 140/82 manual repeat. She c/o headaches everyday. She does not check her BP at home. She recently had sexual intercourse in August and she had vaginal bleeding. Her OBGYN is located in [place] so that's why she made an appointment with a local NP OBGYN. She saw an NP OBGYN but it was mainly an establish care visit, they did not really evaluate the vaginal bleeding.      Patient Active Problem List   Diagnosis Code    Primary open angle glaucoma (POAG) of both eyes H40.1130    Essential hypertension I10        Current Outpatient Medications on File Prior to Visit   Medication Sig Dispense Refill    acetaZOLAMIDE (DIAMOX) 125 mg tablet Take 2 tablets by mouth twice daily 120 Tablet 0    latanoprost (XALATAN) 0.005 % ophthalmic solution Administer 1 Drop to both eyes nightly.  OTHER seamoss      BURDOCK ROOT PO Take  by mouth.  ferrous sulfate 325 mg (65 mg iron) tablet Take  by mouth Daily (before breakfast). (Patient not taking: Reported on 10/25/2021)       No current facility-administered medications on file prior to visit.        Allergies   Allergen Reactions    Bactrim [Sulfamethoprim] Nausea and Vomiting       Past Medical History:   Diagnosis Date    Chronic tension headaches     Glaucoma     Hypertension        Past Surgical History:   Procedure Laterality Date    HX GYN      hysterectomy    HX TONSILLECTOMY  1967       Family History   Problem Relation Age of Onset    Parkinson's Disease Mother     Emphysema Father     Panic disorder Maternal Grandfather     Alcohol abuse Paternal Grandfather     Diabetes Sister     No Known Problems Brother     Thyroid Disease Brother        Social History     Socioeconomic History    Marital status: SINGLE     Spouse name: Not on file    Number of children: Not on file    Years of education: Not on file    Highest education level: Not on file   Occupational History    Not on file   Tobacco Use    Smoking status: Never Smoker    Smokeless tobacco: Never Used   Vaping Use    Vaping Use: Never used   Substance and Sexual Activity    Alcohol use: No    Drug use: No    Sexual activity: Not Currently   Other Topics Concern    Not on file   Social History Narrative    Not on file     Social Determinants of Health     Financial Resource Strain:     Difficulty of Paying Living Expenses:    Food Insecurity:     Worried About Running Out of [employer] in the Last Year:    [address] in the Last Year:    Transportation Needs:     Lack of Transportation (Medical):  Lack of Transportation (Non-Medical):    Physical Activity:     Days of Exercise per Week:     Minutes of Exercise per Session:    Stress:     Feeling of Stress :    Social Connections:     Frequency of Communication with Friends and Family:     Frequency of Social Gatherings with Friends and Family:     Attends [employer] Services:     Active Member of Clubs or Organizations:     Attends Club or Organization Meetings:     Marital Status:    Intimate Partner Violence:     Fear of Current or Ex-Partner:     Emotionally Abused:     Physically Abused:     Sexually Abused:        No visits with results within 3 Month(s) from this visit. Latest known visit with results is:   Orders Only on 01/08/2021   Component Date Value Ref Range Status    BENNY, Direct 01/08/2021 Positive* Negative   Final    Comment: (NOTE)  Performed At: [address] [id number]  Sheryle Sicks MD TC:[id number]      Sed rate, automated 01/08/2021 22  0 - 30 mm/hr Final    Hep C virus Ab Interp. 01/08/2021 NONREACTIVE  NONREACTIVE   Final    Hep C  virus Ab comment 01/08/2021 Method used is TipRanks    Final    LIPID PROFILE 01/08/2021        Final    Cholesterol, total 01/08/2021 171  <200 MG/DL Final    Triglyceride 01/08/2021 57  <150 MG/DL Final    Comment: Based on NCEP-ATP III:  Triglycerides <150 mg/dL  is considered normal, 150-199  mg/dL  borderline high,  200-499 mg/dL high and  greater than or equal to 500  mg/dL very high.  HDL Cholesterol 01/08/2021 65  MG/DL Final    Comment: Based on NCEP ATP III, HDL Cholesterol <40 mg/dL is considered low and >60  mg/dL is elevated.       LDL, calculated 01/08/2021 94.6  0 - 100 MG/DL Final    Comment: Based on the NCEP-ATP: LDL-C concentrations are considered  optimal <100 mg/dL,  near optimal/above Normal 100-129 mg/dL Borderline High: 130-159, High: 160-189  mg/dL Very High: Greater than or equal to 190 mg/dL      VLDL, calculated 01/08/2021 11.4  MG/DL Final    CHOL/HDL Ratio 01/08/2021 2.6  0.0 - 5.0   Final    WBC 01/08/2021 5.0  3.6 - 11.0 K/uL Final    RBC 01/08/2021 4.30  3.80 - 5.20 M/uL Final    HGB 01/08/2021 12.8  11.5 - 16.0 g/dL Final    HCT 01/08/2021 40.2  35.0 - 47.0 % Final    MCV 01/08/2021 93.5  80.0 - 99.0 FL Final    MCH 01/08/2021 29.8  26.0 - 34.0 PG Final    MCHC 01/08/2021 31.8  30.0 - 36.5 g/dL Final    RDW 01/08/2021 13.8  11.5 - 14.5 % Final    PLATELET 75/04/2326 469  150 - 400 K/uL Final    MPV 01/08/2021 12.5  8.9 - 12.9 FL Final    NRBC 01/08/2021 0.0  0  WBC Final    ABSOLUTE NRBC 01/08/2021 0.00  0.00 - 0.01 K/uL Final    Sodium 01/08/2021 142  136 - 145 mmol/L Final    Potassium 01/08/2021 3.8  3.5 - 5.1 mmol/L Final    Chloride 01/08/2021 108  97 - 108 mmol/L Final    CO2 01/08/2021 28  21 - 32 mmol/L Final    Anion gap 01/08/2021 6  5 - 15 mmol/L Final    Glucose 01/08/2021 97  65 - 100 mg/dL Final    BUN 01/08/2021 15  6 - 20 MG/DL Final    Creatinine 01/08/2021 0.90  0.55 - 1.02 MG/DL Final    BUN/Creatinine ratio 01/08/2021 17  12 - 20   Final    GFR est AA 01/08/2021 >60  >60 ml/min/1.73m2 Final    GFR est non-AA 01/08/2021 >60  >60 ml/min/1.73m2 Final    Comment: Estimated GFR is calculated using the IDMS-traceable Modification of Diet in  Renal Disease (MDRD) Study equation, reported for both  Americans  (GFRAA) and non- Americans (GFRNA), and normalized to 1.73m2 body  surface area. The physician must decide which value applies to the patient.  Calcium 01/08/2021 9.3  8.5 - 10.1 MG/DL Final    Bilirubin, total 01/08/2021 0.5  0.2 - 1.0 MG/DL Final    ALT (SGPT) 01/08/2021 20  12 - 78 U/L Final    AST (SGOT) 01/08/2021 14* 15 - 37 U/L Final    Alk.  phosphatase 01/08/2021 134* 45 - 117 U/L Final    Protein, total 2021 7.5  6.4 - 8.2 g/dL Final    Albumin 2021 3.9  3.5 - 5.0 g/dL Final    Globulin 2021 3.6  2.0 - 4.0 g/dL Final    A-G Ratio 2021 1.1  1.1 - 2.2   Final    Anti-DNA (DS) Ab, QT 2021 <1  0 - 9 IU/mL Final    Comment: (NOTE)                                    Negative      <5                                    Equivocal  5 - 9                                    Positive      >9      RNP Abs 2021 <0.2  0.0 - 0.9 AI Final    Rizzo Abs 2021 <0.2  0.0 - 0.9 AI Final    Scleroderma-70 Ab 2021 2.5* 0.0 - 0.9 AI Final    Sjogren's Anti-SS-A 2021 <0.2  0.0 - 0.9 AI Final    Sjogren's Anti-SS-B 2021 <0.2  0.0 - 0.9 AI Final    Antichromatin Ab 2021 <0.2  0.0 - 0.9 AI Final    Anti-Carrie-1 2021 <0.2  0.0 - 0.9 AI Final    Centromere B Ab 2021 <0.2  0.0 - 0.9 AI Final    Comment: (NOTE)  Performed At: [address] [id number]  Theo Gatica MD SQ:[id number]      See below 2021 Comment    Final    Comment: (NOTE)  Autoantibody                       Disease Association  ------------------------------------------------------------                         Condition                  Frequency  ---------------------   ------------------------   ---------  Antinuclear Antibody,    SLE, mixed connective  Direct (BENNY-D)           tissue diseases  ---------------------   ------------------------   ---------  dsDNA                    SLE                        40 - 60%  ---------------------   ------------------------   ---------  Chromatin                Drug induced SLE                90%                          SLE                        48 - 97%  ---------------------   ------------------------   ---------  SSA (Ro)                 SLE                        25 - 35%                          Sjogren's Syndrome         40 - 70%                           Lupus 100%  ---------------------   ------------------------   ---------  SSB (La)                 SLE                                                        10%                          Sjogren's Syndrome              30%  ---------------------   -----------------------    ---------  Sm (anti-Smith)          SLE                        15 - 30%  ---------------------   -----------------------    ---------  RNP                      Mixed Connective Tissue                          Disease                         95%  (U1 nRNP,                SLE                        30 - 50%  anti-ribonucleoprotein)  Polymyositis and/or                          Dermatomyositis                 20%  ---------------------   ------------------------   ---------  Scl-70 (antiDNA          Scleroderma (diffuse)      20 - 35%  topoisomerase)           Crest                           13%  ---------------------   ------------------------   ---------  Carrie-1                     Polymyositis and/or                          Dermatomyositis            20 - 40%  ---------------------   ------------------------   ---------  Centromere B             Scleroderma -                            Crest                          variant                         80%  Performed At: [address] [id number]  Rosie Hall MD DN:[id number]         Review of Systems   Constitutional: Negative for activity change, fatigue and unexpected weight change. HENT: Negative for congestion, hearing loss, rhinorrhea and sore throat. Eyes: Negative for discharge. Respiratory: Negative for cough, chest tightness and shortness of breath. Cardiovascular: Negative for leg swelling. Gastrointestinal: Negative for abdominal pain, constipation and diarrhea. Genitourinary: Positive for vaginal bleeding. Negative for dysuria, flank pain, frequency and urgency. Musculoskeletal: Negative for arthralgias, back pain and myalgias.    Skin: Negative for color change and rash. Neurological: Positive for headaches. Negative for dizziness and light-headedness. Psychiatric/Behavioral: Negative for dysphoric mood and sleep disturbance. The patient is not nervous/anxious. Visit Vitals  BP (!) 140/82 (BP 1 Location: Right arm)   Pulse (!) 55   Temp 97.3 °F (36.3 °C)   Resp 15   Ht 5' 6\" (1.676 m)   Wt 163 lb 3.2 oz (74 kg)   SpO2 99%   BMI 26.34 kg/m²       Physical Exam  Vitals and nursing note reviewed. Constitutional:       General: She is not in acute distress. Appearance: Normal appearance. She is well-developed. She is not diaphoretic. HENT:      Right Ear: External ear normal.      Left Ear: External ear normal.   Eyes:      General: No scleral icterus. Right eye: No discharge. Left eye: No discharge. Extraocular Movements: Extraocular movements intact. Conjunctiva/sclera: Conjunctivae normal.   Cardiovascular:      Rate and Rhythm: Normal rate and regular rhythm. Pulmonary:      Effort: Pulmonary effort is normal.      Breath sounds: Normal breath sounds. No wheezing. Abdominal:      General: Bowel sounds are normal.      Palpations: Abdomen is soft. Tenderness: There is no abdominal tenderness. Musculoskeletal:      Cervical back: Normal range of motion and neck supple. Lymphadenopathy:      Cervical: No cervical adenopathy. Neurological:      Mental Status: She is alert and oriented to person, place, and time. Psychiatric:         Mood and Affect: Mood and affect normal.             An electronic signature was used to authenticate this note.   -- [employer]

## 2021-11-16 DIAGNOSIS — R51.9 FREQUENT HEADACHES: ICD-10-CM

## 2021-11-16 DIAGNOSIS — H40.1130 PRIMARY OPEN ANGLE GLAUCOMA OF BOTH EYES, UNSPECIFIED GLAUCOMA STAGE: ICD-10-CM

## 2021-11-16 DIAGNOSIS — I10 ESSENTIAL HYPERTENSION: ICD-10-CM

## 2021-11-16 RX ORDER — ACETAZOLAMIDE 125 MG/1
TABLET ORAL
Qty: 120 TABLET | Refills: 0 | Status: SHIPPED | OUTPATIENT
Start: 2021-11-16 | End: 2021-12-17

## 2021-12-17 DIAGNOSIS — I10 ESSENTIAL HYPERTENSION: ICD-10-CM

## 2021-12-17 DIAGNOSIS — R51.9 FREQUENT HEADACHES: ICD-10-CM

## 2021-12-17 DIAGNOSIS — H40.1130 PRIMARY OPEN ANGLE GLAUCOMA OF BOTH EYES, UNSPECIFIED GLAUCOMA STAGE: ICD-10-CM

## 2021-12-17 RX ORDER — ACETAZOLAMIDE 125 MG/1
TABLET ORAL
Qty: 120 TABLET | Refills: 0 | Status: SHIPPED | OUTPATIENT
Start: 2021-12-17 | End: 2022-01-15

## 2022-01-15 DIAGNOSIS — I10 UNCONTROLLED HYPERTENSION: ICD-10-CM

## 2022-01-15 DIAGNOSIS — I10 ESSENTIAL HYPERTENSION: ICD-10-CM

## 2022-01-15 DIAGNOSIS — R51.9 FREQUENT HEADACHES: ICD-10-CM

## 2022-01-15 DIAGNOSIS — H40.1130 PRIMARY OPEN ANGLE GLAUCOMA OF BOTH EYES, UNSPECIFIED GLAUCOMA STAGE: ICD-10-CM

## 2022-01-15 RX ORDER — ACETAZOLAMIDE 125 MG/1
TABLET ORAL
Qty: 120 TABLET | Refills: 0 | Status: SHIPPED | OUTPATIENT
Start: 2022-01-15 | End: 2022-02-22

## 2022-01-15 RX ORDER — AMLODIPINE BESYLATE 5 MG/1
TABLET ORAL
Qty: 30 TABLET | Refills: 0 | Status: SHIPPED | OUTPATIENT
Start: 2022-01-15 | End: 2022-02-22

## 2022-02-22 DIAGNOSIS — I10 ESSENTIAL HYPERTENSION: ICD-10-CM

## 2022-02-22 DIAGNOSIS — I10 UNCONTROLLED HYPERTENSION: ICD-10-CM

## 2022-02-22 DIAGNOSIS — R51.9 FREQUENT HEADACHES: ICD-10-CM

## 2022-02-22 DIAGNOSIS — H40.1130 PRIMARY OPEN ANGLE GLAUCOMA OF BOTH EYES, UNSPECIFIED GLAUCOMA STAGE: ICD-10-CM

## 2022-02-22 RX ORDER — ACETAZOLAMIDE 125 MG/1
TABLET ORAL
Qty: 120 TABLET | Refills: 0 | Status: SHIPPED | OUTPATIENT
Start: 2022-02-22 | End: 2022-03-29

## 2022-02-22 RX ORDER — AMLODIPINE BESYLATE 5 MG/1
TABLET ORAL
Qty: 30 TABLET | Refills: 0 | Status: SHIPPED | OUTPATIENT
Start: 2022-02-22 | End: 2022-03-29

## 2022-03-18 PROBLEM — I10 ESSENTIAL HYPERTENSION: Status: ACTIVE | Noted: 2020-12-14

## 2022-03-19 PROBLEM — H40.1130 PRIMARY OPEN ANGLE GLAUCOMA (POAG) OF BOTH EYES: Status: ACTIVE | Noted: 2020-12-14

## 2022-03-29 DIAGNOSIS — I10 ESSENTIAL HYPERTENSION: ICD-10-CM

## 2022-03-29 DIAGNOSIS — H40.1130 PRIMARY OPEN ANGLE GLAUCOMA OF BOTH EYES, UNSPECIFIED GLAUCOMA STAGE: ICD-10-CM

## 2022-03-29 DIAGNOSIS — R51.9 FREQUENT HEADACHES: ICD-10-CM

## 2022-03-29 DIAGNOSIS — I10 UNCONTROLLED HYPERTENSION: ICD-10-CM

## 2022-03-29 RX ORDER — ACETAZOLAMIDE 125 MG/1
TABLET ORAL
Qty: 120 TABLET | Refills: 0 | Status: SHIPPED | OUTPATIENT
Start: 2022-03-29 | End: 2022-04-28

## 2022-03-29 RX ORDER — AMLODIPINE BESYLATE 5 MG/1
TABLET ORAL
Qty: 30 TABLET | Refills: 0 | Status: SHIPPED | OUTPATIENT
Start: 2022-03-29 | End: 2022-04-28

## 2022-04-12 ENCOUNTER — OFFICE VISIT (OUTPATIENT)
Dept: PRIMARY CARE CLINIC | Age: 62
End: 2022-04-12
Payer: COMMERCIAL

## 2022-04-12 VITALS
TEMPERATURE: 98.5 F | HEART RATE: 69 BPM | HEIGHT: 66 IN | WEIGHT: 168 LBS | OXYGEN SATURATION: 99 % | DIASTOLIC BLOOD PRESSURE: 80 MMHG | BODY MASS INDEX: 27 KG/M2 | SYSTOLIC BLOOD PRESSURE: 130 MMHG

## 2022-04-12 DIAGNOSIS — K21.9 GASTROESOPHAGEAL REFLUX DISEASE WITHOUT ESOPHAGITIS: ICD-10-CM

## 2022-04-12 DIAGNOSIS — Z12.31 ENCOUNTER FOR SCREENING MAMMOGRAM FOR MALIGNANT NEOPLASM OF BREAST: ICD-10-CM

## 2022-04-12 DIAGNOSIS — H40.1130 PRIMARY OPEN ANGLE GLAUCOMA OF BOTH EYES, UNSPECIFIED GLAUCOMA STAGE: ICD-10-CM

## 2022-04-12 DIAGNOSIS — I10 PRIMARY HYPERTENSION: Primary | ICD-10-CM

## 2022-04-12 DIAGNOSIS — M35.9 AUTOIMMUNE DISEASE (HCC): ICD-10-CM

## 2022-04-12 DIAGNOSIS — Z12.11 COLON CANCER SCREENING: ICD-10-CM

## 2022-04-12 PROCEDURE — 99213 OFFICE O/P EST LOW 20 MIN: CPT | Performed by: INTERNAL MEDICINE

## 2022-04-12 NOTE — PROGRESS NOTES
Thai Sanchez (: 1960) is a 65 y.o. female, established patient, here for evaluation of the following chief complaint(s):  Follow-up and Blood Pressure Check     Written by Enma Calle, as dictated by Dr. Malena Rizzo MD.      ASSESSMENT/PLAN:  Below is the assessment and plan developed based on review of pertinent history, physical exam, labs, studies, and medications. 1. Primary hypertension  BP is well controlled on amlodipine 5 mg daily. Continue current medication(s). 2. Primary open angle glaucoma of both eyes, unspecified glaucoma stage  Followed by Ophthalmology. Continue on Diamox 125 mg BID. 3. Encounter for screening mammogram for malignant neoplasm of breast  Ordered screening mammogram.  -     IAN 3D VICTORIANO W MAMMO BI SCREENING INCL CAD; Future    4. Colon cancer screening  I provided her with a referral to Dr. Brent Gandara (GI).   -     REFERRAL TO GASTROENTEROLOGY    5. Autoimmune disease ([address]  Stable at this time. She has been evaluated by Dr. Naveen Smiley (rheumatology). Can consider further workup if her fatigue does not improve. 6. Gastroesophageal reflux disease without esophagitis  Recommend she have an EGD and colonoscopy at the same time and can discuss this with Dr. Brent Gandara. SUBJECTIVE/OBJECTIVE:  HPI   The patient presents today for a routine follow-up of chronic conditions. She is currently doing a morning show on the radio in [place], but still lives in [place]. She mentions feeling more fatigued recently. She goes to the gym regularly and usually feels good after workouts. Her BP today is good at 125/83, 130/80 on manual repeat. She is on amlodipine 5 mg daily for HTN. She gets occasional headaches, but does not check her BP at that time. She does not check her BP regularly at home. She is followed by Ophthalmology for open angle glaucoma of both eyes. She has a follow-up in May 2022. She continues on Diamox 125 mg BID.      She was told she had reflux had a previous ED visit and rx'd omeprazole, but she has not been taking this. She has an appt with Dr. Joceline Lara (OB/GYN) on 06/08/22. Patient Active Problem List   Diagnosis Code    Primary open angle glaucoma (POAG) of both eyes H40.1130    Essential hypertension I10    Gastroesophageal reflux disease without esophagitis K21.9        Current Outpatient Medications on File Prior to Visit   Medication Sig Dispense Refill    cholecalciferol, vitamin D3, (VITAMIN D3 PO) Take  by mouth.  ZINC PO Take  by mouth.  amLODIPine (NORVASC) 5 mg tablet TAKE 1 TABLET BY MOUTH ONE TIME DAILY 30 Tablet 0    acetaZOLAMIDE (DIAMOX) 125 mg tablet TAKE TWO TABLETS BY MOUTH TWICE DAILY 120 Tablet 0    latanoprost (XALATAN) 0.005 % ophthalmic solution Administer 1 Drop to both eyes nightly.  OTHER seamoss      BURDOCK ROOT PO Take  by mouth.  ferrous sulfate 325 mg (65 mg iron) tablet Take  by mouth Daily (before breakfast). (Patient not taking: Reported on 10/25/2021)       No current facility-administered medications on file prior to visit.        Allergies   Allergen Reactions    Bactrim [Sulfamethoprim] Nausea and Vomiting       Past Medical History:   Diagnosis Date    Chronic tension headaches     Glaucoma     Hypertension        Past Surgical History:   Procedure Laterality Date    HX GYN      hysterectomy    HX TONSILLECTOMY  1967       Family History   Problem Relation Age of Onset    Parkinson's Disease Mother     Emphysema Father     Panic disorder Maternal Grandfather     Alcohol abuse Paternal Grandfather     Diabetes Sister     No Known Problems Brother     Thyroid Disease Brother        Social History     Socioeconomic History    Marital status: SINGLE     Spouse name: Not on file    Number of children: Not on file    Years of education: Not on file    Highest education level: Not on file   Occupational History    Not on file   Tobacco Use    Smoking status: Never Smoker    Smokeless tobacco: Never Used   Vaping Use    Vaping Use: Never used   Substance and Sexual Activity    Alcohol use: No    Drug use: No    Sexual activity: Not Currently   Other Topics Concern    Not on file   Social History Narrative    Not on file       No visits with results within 3 Month(s) from this visit. Latest known visit with results is:   Orders Only on 01/08/2021   Component Date Value Ref Range Status    BENNY, Direct 01/08/2021 Positive* Negative   Final    Comment: (NOTE)  Performed At: [facility] & [address] [id number]  Juanjo Garcia MD [id number]      Sed rate, automated 01/08/2021 22  0 - 30 mm/hr Final    Hep C virus Ab Interp. 01/08/2021 NONREACTIVE  NONREACTIVE   Final    Hep C  virus Ab comment 01/08/2021 Method used is Spongecell    Final    LIPID PROFILE 01/08/2021        Final    Cholesterol, total 01/08/2021 171  <200 MG/DL Final    Triglyceride 01/08/2021 57  <150 MG/DL Final    Comment: Based on NCEP-ATP III:  Triglycerides <150 mg/dL  is considered normal, 150-199  mg/dL  borderline high,  200-499 mg/dL high and  greater than or equal to 500  mg/dL very high.  HDL Cholesterol 01/08/2021 65  MG/DL Final    Comment: Based on NCEP ATP III, HDL Cholesterol <40 mg/dL is considered low and >60  mg/dL is elevated.       LDL, calculated 01/08/2021 94.6  0 - 100 MG/DL Final    Comment: Based on the NCEP-ATP: LDL-C concentrations are considered  optimal <100 mg/dL,  near optimal/above Normal 100-129 mg/dL Borderline High: 130-159, High: 160-189  mg/dL Very High: Greater than or equal to 190 mg/dL      VLDL, calculated 01/08/2021 11.4  MG/DL Final    CHOL/HDL Ratio 01/08/2021 2.6  0.0 - 5.0   Final    WBC 01/08/2021 5.0  3.6 - 11.0 K/uL Final    RBC 01/08/2021 4.30  3.80 - 5.20 M/uL Final    HGB 01/08/2021 12.8  11.5 - 16.0 g/dL Final    HCT 01/08/2021 40.2  35.0 - 47.0 % Final    MCV 01/08/2021 93.5  80.0 - 99.0 FL Final    MCH 01/08/2021 29.8  26.0 - 34.0 PG Final    MCHC 01/08/2021 31.8  30.0 - 36.5 g/dL Final    RDW 01/08/2021 13.8  11.5 - 14.5 % Final    PLATELET 67/78/0708 592  150 - 400 K/uL Final    MPV 01/08/2021 12.5  8.9 - 12.9 FL Final    NRBC 01/08/2021 0.0  0  WBC Final    ABSOLUTE NRBC 01/08/2021 0.00  0.00 - 0.01 K/uL Final    Sodium 01/08/2021 142  136 - 145 mmol/L Final    Potassium 01/08/2021 3.8  3.5 - 5.1 mmol/L Final    Chloride 01/08/2021 108  97 - 108 mmol/L Final    CO2 01/08/2021 28  21 - 32 mmol/L Final    Anion gap 01/08/2021 6  5 - 15 mmol/L Final    Glucose 01/08/2021 97  65 - 100 mg/dL Final    BUN 01/08/2021 15  6 - 20 MG/DL Final    Creatinine 01/08/2021 0.90  0.55 - 1.02 MG/DL Final    BUN/Creatinine ratio 01/08/2021 17  12 - 20   Final    GFR est AA 01/08/2021 >60  >60 ml/min/1.73m2 Final    GFR est non-AA 01/08/2021 >60  >60 ml/min/1.73m2 Final    Comment: Estimated GFR is calculated using the IDMS-traceable Modification of Diet in  Renal Disease (MDRD) Study equation, reported for both  Americans  (GFRAA) and non- Americans (GFRNA), and normalized to 1.73m2 body  surface area. The physician must decide which value applies to the patient.  Calcium 01/08/2021 9.3  8.5 - 10.1 MG/DL Final    Bilirubin, total 01/08/2021 0.5  0.2 - 1.0 MG/DL Final    ALT (SGPT) 01/08/2021 20  12 - 78 U/L Final    AST (SGOT) 01/08/2021 14* 15 - 37 U/L Final    Alk.  phosphatase 01/08/2021 134* 45 - 117 U/L Final    Protein, total 01/08/2021 7.5  6.4 - 8.2 g/dL Final    Albumin 01/08/2021 3.9  3.5 - 5.0 g/dL Final    Globulin 01/08/2021 3.6  2.0 - 4.0 g/dL Final    A-G Ratio 01/08/2021 1.1  1.1 - 2.2   Final    Anti-DNA (DS) Ab, QT 01/08/2021 <1  0 - 9 IU/mL Final    Comment: (NOTE)                                    Negative      <5                                    Equivocal  5 - 9                                    Positive      >9      RNP Abs 01/08/2021 <0.2 0.0 - 0.9 AI Final    Rizzo Abs 2021 <0.2  0.0 - 0.9 AI Final    Scleroderma-70 Ab 2021 2.5* 0.0 - 0.9 AI Final    Sjogren's Anti-SS-A 2021 <0.2  0.0 - 0.9 AI Final    Sjogren's Anti-SS-B 2021 <0.2  0.0 - 0.9 AI Final    Antichromatin Ab 2021 <0.2  0.0 - 0.9 AI Final    Anti-Carrie-1 2021 <0.2  0.0 - 0.9 AI Final    Centromere B Ab 2021 <0.2  0.0 - 0.9 AI Final    Comment: (NOTE)  Performed At: [facility] & [address] [id number]  Terese Hays MD [id number]      See below 2021 Comment    Final    Comment: (NOTE)  Autoantibody                       Disease Association  ------------------------------------------------------------                         Condition                  Frequency  ---------------------   ------------------------   ---------  Antinuclear Antibody,    SLE, mixed connective  Direct (BENNY-D)           tissue diseases  ---------------------   ------------------------   ---------  dsDNA                    SLE                        40 - 60%  ---------------------   ------------------------   ---------  Chromatin                Drug induced SLE                90%                          SLE                        48 - 97%  ---------------------   ------------------------   ---------  SSA (Ro)                 SLE                        25 - 35%                          Sjogren's Syndrome         40 - 70%                           Lupus                 100%  ---------------------   ------------------------   ---------  SSB (La)                 SLE                                                        10%                          Sjogren's Syndrome              30%  ---------------------   -----------------------    ---------  Sm (anti-Smith)          SLE                        15 - 30%  ---------------------   -----------------------    ---------  RNP                      Mixed Connective Tissue Disease                         95%  (U1 nRNP,                SLE                        30 - 50%  anti-ribonucleoprotein)  Polymyositis and/or                          Dermatomyositis                 20%  ---------------------   ------------------------   ---------  Scl-70 (antiDNA          Scleroderma (diffuse)      20 - 35%  topoisomerase)           Crest                           13%  ---------------------   ------------------------   ---------  Carrie-1                     Polymyositis and/or                          Dermatomyositis            20 - 40%  ---------------------   ------------------------   ---------  Centromere B             Scleroderma -                            Crest                          variant                         80%  Performed At: [address] [id number]  Aj Finch MD W        Review of Systems   Constitutional: Positive for fatigue. Negative for activity change and unexpected weight change. HENT: Negative for congestion, hearing loss, rhinorrhea and sore throat. Eyes: Negative for discharge. Respiratory: Negative for cough, chest tightness and shortness of breath. Cardiovascular: Negative for leg swelling. Gastrointestinal: Negative for abdominal pain, constipation and diarrhea. Genitourinary: Negative for dysuria, flank pain, frequency and urgency. Musculoskeletal: Negative for arthralgias, back pain and myalgias. Skin: Negative for color change and rash. Neurological: Negative for dizziness, light-headedness and headaches. Psychiatric/Behavioral: Negative for dysphoric mood and sleep disturbance. The patient is not nervous/anxious. Visit Vitals  /80 (BP 1 Location: Left arm, BP Patient Position: Sitting)   Pulse 69   Temp 98.5 °F (36.9 °C) (Temporal)   Ht 5' 6\" (1.676 m)   Wt 168 lb (76.2 kg)   SpO2 99%   BMI 27.12 kg/m²      Physical Exam  Vitals and nursing note reviewed. Constitutional:       General: She is not in acute distress. Appearance: Normal appearance. She is well-developed. She is not diaphoretic. HENT:      Right Ear: External ear normal.      Left Ear: External ear normal.   Eyes:      General: No scleral icterus. Right eye: No discharge. Left eye: No discharge. Extraocular Movements: Extraocular movements intact. Conjunctiva/sclera: Conjunctivae normal.   Cardiovascular:      Rate and Rhythm: Normal rate and regular rhythm. Pulmonary:      Effort: Pulmonary effort is normal.      Breath sounds: Normal breath sounds. No wheezing. Abdominal:      General: Bowel sounds are normal.      Palpations: Abdomen is soft. Tenderness: There is no abdominal tenderness. Musculoskeletal:      Cervical back: Normal range of motion and neck supple. Lymphadenopathy:      Cervical: No cervical adenopathy. Neurological:      Mental Status: She is alert and oriented to person, place, and time. Psychiatric:         Mood and Affect: Mood and affect normal.       An electronic signature was used to authenticate this note.   -- Nigel Flavio

## 2022-04-12 NOTE — PROGRESS NOTES
Identified pt with two pt identifiers. Reviewed record in preparation for visit and have obtained necessary documentation. All patient medications has been reviewed. Chief Complaint   Patient presents with    Follow-up    Blood Pressure Check     Additional information about chief complaint:    Visit Vitals  /83 (BP 1 Location: Left upper arm, BP Patient Position: Sitting, BP Cuff Size: Large adult)   Pulse 69   Temp 98.5 °F (36.9 °C) (Temporal)   Ht 5' 6\" (1.676 m)   Wt 168 lb (76.2 kg)   SpO2 99%   BMI 27.12 kg/m²       Health Maintenance Due   Topic    DTaP/Tdap/Td series (1 - Tdap)    Colorectal Cancer Screening Combo     Shingrix Vaccine Age 50> (1 of 2)    Breast Cancer Screen Mammogram     COVID-19 Vaccine (3 - Booster for NUVETA series)       1. Have you been to the ER, urgent care clinic since your last visit? Hospitalized since your last visit? No    2. Have you seen or consulted any other health care providers outside of the [address] [place] [address] since your last visit? Include any pap smears or colon screening.  No

## 2022-04-28 DIAGNOSIS — H40.1130 PRIMARY OPEN ANGLE GLAUCOMA OF BOTH EYES, UNSPECIFIED GLAUCOMA STAGE: ICD-10-CM

## 2022-04-28 DIAGNOSIS — R51.9 FREQUENT HEADACHES: ICD-10-CM

## 2022-04-28 DIAGNOSIS — I10 UNCONTROLLED HYPERTENSION: ICD-10-CM

## 2022-04-28 DIAGNOSIS — I10 ESSENTIAL HYPERTENSION: ICD-10-CM

## 2022-04-28 RX ORDER — ACETAZOLAMIDE 125 MG/1
TABLET ORAL
Qty: 120 TABLET | Refills: 0 | Status: SHIPPED | OUTPATIENT
Start: 2022-04-28 | End: 2022-06-06

## 2022-04-28 RX ORDER — AMLODIPINE BESYLATE 5 MG/1
TABLET ORAL
Qty: 30 TABLET | Refills: 0 | Status: SHIPPED | OUTPATIENT
Start: 2022-04-28 | End: 2022-05-30

## 2022-06-06 DIAGNOSIS — I10 ESSENTIAL HYPERTENSION: ICD-10-CM

## 2022-06-06 DIAGNOSIS — H40.1130 PRIMARY OPEN ANGLE GLAUCOMA OF BOTH EYES, UNSPECIFIED GLAUCOMA STAGE: ICD-10-CM

## 2022-06-06 DIAGNOSIS — R51.9 FREQUENT HEADACHES: ICD-10-CM

## 2022-06-06 RX ORDER — ACETAZOLAMIDE 125 MG/1
TABLET ORAL
Qty: 120 TABLET | Refills: 0 | Status: SHIPPED | OUTPATIENT
Start: 2022-06-06 | End: 2022-07-15

## 2022-06-08 ENCOUNTER — OFFICE VISIT (OUTPATIENT)
Dept: OBGYN CLINIC | Age: 62
End: 2022-06-08
Payer: COMMERCIAL

## 2022-06-08 VITALS
SYSTOLIC BLOOD PRESSURE: 138 MMHG | DIASTOLIC BLOOD PRESSURE: 86 MMHG | HEIGHT: 66 IN | WEIGHT: 167 LBS | BODY MASS INDEX: 26.84 KG/M2

## 2022-06-08 DIAGNOSIS — Z01.419 ENCOUNTER FOR GYNECOLOGICAL EXAMINATION WITHOUT ABNORMAL FINDING: Primary | ICD-10-CM

## 2022-06-08 PROCEDURE — 99386 PREV VISIT NEW AGE 40-64: CPT | Performed by: OBSTETRICS & GYNECOLOGY

## 2022-06-08 RX ORDER — ESTRADIOL 10 UG/1
10 INSERT VAGINAL
Qty: 24 TABLET | Refills: 4 | Status: SHIPPED | OUTPATIENT
Start: 2022-06-09

## 2022-06-08 NOTE — PATIENT INSTRUCTIONS

## 2022-06-08 NOTE — PROGRESS NOTES
Annual exam    Artis Burton is a 65 y.o. postmenopausal female presenting for annual exam. Her main concerns today include having a general gyn exam. She states she has vaginal discomfort with exams and intercourse, had one episode of post-coital bleeding last August. Has not had intercourse since that time. Still occasional hot flashes. She declines a chaperone during the gynecologic exam today. Ob/Gyn Hx:  G0  Menopause age - LAURA hyst 47 - thinks ovaries were also removed.     PMB - one episode of post-coital spotting last August  Menopausal symptoms - vaginal discomfort  HRT - no  Sexually active - not currently    Health maintenance:  Pap- n/a  Mammo- scheduled  Colonoscopy- set up    Past Medical History:   Diagnosis Date    Chronic tension headaches     Glaucoma     Hypertension        Past Surgical History:   Procedure Laterality Date    HX GYN      hysterectomy    HX TONSILLECTOMY  1967       Family History   Problem Relation Age of Onset    Parkinson's Disease Mother     Emphysema Father     Panic disorder Maternal Grandfather     Alcohol abuse Paternal Grandfather     Diabetes Sister     No Known Problems Brother     Thyroid Disease Brother        Social History     Socioeconomic History    Marital status: SINGLE     Spouse name: Not on file    Number of children: Not on file    Years of education: Not on file    Highest education level: Not on file   Occupational History    Not on file   Tobacco Use    Smoking status: Never Smoker    Smokeless tobacco: Never Used   Vaping Use    Vaping Use: Never used   Substance and Sexual Activity    Alcohol use: No    Drug use: No    Sexual activity: Not Currently   Other Topics Concern    Not on file   Social History Narrative    Not on file     Social Determinants of Health     Financial Resource Strain:     Difficulty of Paying Living Expenses: Not on file   Food Insecurity:     Worried About Running Out of Food in the Last Year: Not on file    Ran Out of Food in the Last Year: Not on file   Transportation Needs:     Lack of Transportation (Medical): Not on file    Lack of Transportation (Non-Medical): Not on file   Physical Activity:     Days of Exercise per Week: Not on file    Minutes of Exercise per Session: Not on file   Stress:     Feeling of Stress : Not on file   Social Connections:     Frequency of Communication with Friends and Family: Not on file    Frequency of Social Gatherings with Friends and Family: Not on file    Attends Holiness Services: Not on file    Active Member of [address] [place] [address] [employer] or Organizations: Not on file    Attends Club or Organization Meetings: Not on file    Marital Status: Not on file   Intimate Partner Violence:     Fear of Current or Ex-Partner: Not on file    Emotionally Abused: Not on file    Physically Abused: Not on file    Sexually Abused: Not on file   Housing Stability:     Unable to Pay for Housing in the Last Year: Not on file    Number of [place] in the Last Year: Not on file    Unstable Housing in the Last Year: Not on file       Current Outpatient Medications   Medication Sig Dispense Refill    acetaZOLAMIDE (DIAMOX) 125 mg tablet TAKE TWO TABLETS BY MOUTH TWICE DAILY 120 Tablet 0    amLODIPine (NORVASC) 5 mg tablet TAKE 1 TABLET BY MOUTH ONE TIME DAILY 30 Tablet 1    cholecalciferol, vitamin D3, (VITAMIN D3 PO) Take  by mouth.  ZINC PO Take  by mouth.  latanoprost (XALATAN) 0.005 % ophthalmic solution Administer 1 Drop to both eyes nightly.  ferrous sulfate 325 mg (65 mg iron) tablet Take  by mouth Daily (before breakfast).       OTHER seamoss         Allergies   Allergen Reactions    Bactrim [Sulfamethoprim] Nausea and Vomiting       Review of Systems - History obtained from the patient, negative except as noted in the HPI  Constitutional: negative for weight loss, fever, night sweats  HEENT: negative for hearing loss, earache, congestion, snoring, sorethroat  CV: negative for chest pain, palpitations, edema  Resp: negative for cough, shortness of breath, wheezing  GI: negative for change in bowel habits, abdominal pain, black or bloody stools  : negative for frequency, dysuria, hematuria, vaginal discharge  MSK: negative for back pain, joint pain, muscle pain  Breast: negative for breast lumps, nipple discharge, galactorrhea  Skin :negative for itching, rash, hives  Neuro: negative for dizziness, headache, confusion, weakness  Psych: negative for anxiety, depression, change in mood  Heme/lymph: negative for bleeding, bruising, pallor    Physical Exam    Visit Vitals  /86 (BP 1 Location: Left arm)   Ht 5' 6\" (1.676 m)   Wt 167 lb (75.8 kg)   BMI 26.95 kg/m²       Constitutional  · Appearance: well-nourished, well developed, alert, in no acute distress    HENT  · Head and Face: appears normal    Neck  · Inspection/Palpation: normal appearance, no masses or tenderness  · Lymph Nodes: no lymphadenopathy present  · Thyroid: gland size normal, nontender, no nodules or masses present on palpation    Chest  · Respiratory Effort: non-labored breathing  · Auscultation: CTAB, normal breath sounds    Cardiovascular  · Heart:  · Auscultation: regular rate and rhythm without murmur  · Extremities: no peripheral edema    Breasts  · Inspection of Breasts: breasts symmetrical, no skin changes, no discharge present, nipple appearance normal, no skin retraction present  · Palpation of Breasts and Axillae: no masses present on palpation, no breast tenderness  · Axillary Lymph Nodes: no lymphadenopathy present    Gastrointestinal  · Abdominal Examination: abdomen non-tender to palpation, normal bowel sounds, no masses present.  Long well healed pfannenstiel incision  · Liver and spleen: no hepatomegaly present, spleen not palpable  · Hernias: no hernias identified    Genitourinary  · External Genitalia: normal appearance for age, no discharge present, no tenderness present, no inflammatory lesions present, no masses present, atrophy of UG mucosa  · Vagina: normal vaginal vault without central or paravaginal defects, no discharge present, no inflammatory lesions present, no masses present, atrophic  · Bladder: non-tender to palpation  · Urethra: appears normal  · Cervix: surgically absent  · Uterus: surgically absent  · Adnexa: no adnexal tenderness present, no adnexal masses present  · Perineum: perineum within normal limits, no evidence of trauma, no rashes or skin lesions present    Skin  · General Inspection: no rash, no lesions identified    Neurologic/Psychiatric  · Mental Status:  · Orientation: grossly oriented to person, place and time  · Mood and Affect: mood normal, affect appropriate      Assessment/Plan:  65 y.o. postmenopausal female presenting for annual exam. Overall doing well. Well woman exam:  Normal gynecologic and breast exams. Healthy habits and lifestyle reviewed. Paps N/A as cervix is surgically absent and no history of high grade cervical dysplasia. Patient declines STD screening. Mammogram already scheduled. She has colonoscopy scheduled.  atrophy and dyspareunia: discussed options and rx vagifem sent.        Wally Michele MD

## 2022-06-14 ENCOUNTER — HOSPITAL ENCOUNTER (OUTPATIENT)
Dept: MAMMOGRAPHY | Age: 62
Discharge: HOME | End: 2022-06-14
Attending: INTERNAL MEDICINE
Payer: COMMERCIAL

## 2022-06-14 DIAGNOSIS — Z12.31 ENCOUNTER FOR SCREENING MAMMOGRAM FOR MALIGNANT NEOPLASM OF BREAST: ICD-10-CM

## 2022-06-14 PROCEDURE — 77063 BREAST TOMOSYNTHESIS BI: CPT

## 2022-07-15 DIAGNOSIS — H40.1130 PRIMARY OPEN ANGLE GLAUCOMA OF BOTH EYES, UNSPECIFIED GLAUCOMA STAGE: ICD-10-CM

## 2022-07-15 DIAGNOSIS — R51.9 FREQUENT HEADACHES: ICD-10-CM

## 2022-07-15 DIAGNOSIS — I10 ESSENTIAL HYPERTENSION: ICD-10-CM

## 2022-07-15 RX ORDER — ACETAZOLAMIDE 125 MG/1
TABLET ORAL
Qty: 120 TABLET | Refills: 0 | Status: SHIPPED | OUTPATIENT
Start: 2022-07-15 | End: 2022-08-22

## 2022-08-10 DIAGNOSIS — I10 UNCONTROLLED HYPERTENSION: ICD-10-CM

## 2022-08-10 DIAGNOSIS — R51.9 FREQUENT HEADACHES: ICD-10-CM

## 2022-08-10 RX ORDER — AMLODIPINE BESYLATE 5 MG/1
TABLET ORAL
Qty: 30 TABLET | Refills: 0 | Status: SHIPPED | OUTPATIENT
Start: 2022-08-10 | End: 2022-09-14

## 2022-08-21 DIAGNOSIS — I10 ESSENTIAL HYPERTENSION: ICD-10-CM

## 2022-08-21 DIAGNOSIS — H40.1130 PRIMARY OPEN ANGLE GLAUCOMA OF BOTH EYES, UNSPECIFIED GLAUCOMA STAGE: ICD-10-CM

## 2022-08-21 DIAGNOSIS — R51.9 FREQUENT HEADACHES: ICD-10-CM

## 2022-08-22 RX ORDER — ACETAZOLAMIDE 125 MG/1
TABLET ORAL
Qty: 120 TABLET | Refills: 0 | Status: SHIPPED | OUTPATIENT
Start: 2022-08-22 | End: 2022-09-18

## 2022-09-08 ENCOUNTER — TELEPHONE (OUTPATIENT)
Dept: OBGYN CLINIC | Age: 62
End: 2022-09-08

## 2022-09-08 NOTE — TELEPHONE ENCOUNTER
65year old patient last seen in the office on 6/8/2022    Patient started vagifem 10 mcg in July and stopped taking it due to allergic reaction    Patient reports the reaction was itching all over her abdomen and some in the vaginal area. Patient reports recently her urine has been cloudy and has red tint      Patient denies frequency, urgency , burning with urination and back pain     Patient denies vaginal bleeding    ?  Lab only visit for urine for culture        Patient would like new option for the vaginal dryness      Please advise    Thank you

## 2022-09-08 NOTE — TELEPHONE ENCOUNTER
Love, Lewis Celeste MD  to Me    RL    1:03 PM  It looks like I had a cancellation tomorrow at 2:20, I could see her then for a problem visit.

## 2022-09-08 NOTE — TELEPHONE ENCOUNTER
Patient advised of Md recommendations and was placed on the schedule to be seen tomorrow at 2:20PM to arrive at 2:00PM    Patient verbalized understanding.

## 2022-09-09 ENCOUNTER — OFFICE VISIT (OUTPATIENT)
Dept: OBGYN CLINIC | Age: 62
End: 2022-09-09
Payer: COMMERCIAL

## 2022-09-09 VITALS
BODY MASS INDEX: 26.03 KG/M2 | DIASTOLIC BLOOD PRESSURE: 92 MMHG | WEIGHT: 162 LBS | HEIGHT: 66 IN | SYSTOLIC BLOOD PRESSURE: 144 MMHG

## 2022-09-09 DIAGNOSIS — N95.2 VAGINAL ATROPHY: ICD-10-CM

## 2022-09-09 DIAGNOSIS — R82.90 CLOUDY URINE: Primary | ICD-10-CM

## 2022-09-09 PROCEDURE — 99213 OFFICE O/P EST LOW 20 MIN: CPT | Performed by: OBSTETRICS & GYNECOLOGY

## 2022-09-09 RX ORDER — ESTRADIOL 0.1 MG/G
CREAM VAGINAL
Qty: 42.5 G | Refills: 4 | Status: SHIPPED | OUTPATIENT
Start: 2022-09-09

## 2022-09-09 NOTE — PROGRESS NOTES
Problem Visit    Radha Galdamez is a 65 y.o.  presenting for problem visit. Her main concern today is possible symptoms of a UTI. Over the last 2 weeks she has noticed her urine is occasionally cloudy, and at times seems to have a \"red tint. \" She denies dysuria, frequency, or urgency. At her annual exam in June she was prescribed vagifem for treatment of  atrophy and dyspareunia. She used these tablets for 6 days, states she stopped due to the onset of intense abdominal itching and a slight rash on the abdomen. Denied vaginal itching or edema. She does feel that her urinary symptoms improved during the time she was using the vagifem.        Ob/Gyn Hx:  G0  LMP- n/a  Menses- menopause  Contraception- none  SA-      Past Medical History:   Diagnosis Date    Chronic tension headaches     Glaucoma     Hypertension        Past Surgical History:   Procedure Laterality Date    HX GYN      hysterectomy    HX TONSILLECTOMY  1967       Family History   Problem Relation Age of Onset    Parkinson's Disease Mother     Emphysema Father     Panic disorder Maternal Grandfather     Alcohol abuse Paternal Grandfather     Diabetes Sister     No Known Problems Brother     Thyroid Disease Brother        Social History     Socioeconomic History    Marital status: SINGLE     Spouse name: Not on file    Number of children: Not on file    Years of education: Not on file    Highest education level: Not on file   Occupational History    Not on file   Tobacco Use    Smoking status: Never    Smokeless tobacco: Never   Vaping Use    Vaping Use: Never used   Substance and Sexual Activity    Alcohol use: No    Drug use: No    Sexual activity: Not Currently   Other Topics Concern    Not on file   Social History Narrative    Not on file     Social Determinants of Health     Financial Resource Strain: Not on file   Food Insecurity: Not on file   Transportation Needs: Not on file   Physical Activity: Not on file   Stress: Not on file Social Connections: Not on file   Intimate Partner Violence: Not on file   Housing Stability: Not on file       Current Outpatient Medications   Medication Sig Dispense Refill    estradioL (ESTRACE) 0.01 % (0.1 mg/gram) vaginal cream Insert 1 g into the vagina nightly for 2 weeks, then twice weekly at bedtime 42.5 g 4    acetaZOLAMIDE (DIAMOX) 125 mg tablet TAKE TWO TABLETS BY MOUTH TWICE DAILY 120 Tablet 0    amLODIPine (NORVASC) 5 mg tablet TAKE 1 TABLET BY MOUTH ONE TIME DAILY 30 Tablet 0    cholecalciferol, vitamin D3, (VITAMIN D3 PO) Take  by mouth. ZINC PO Take  by mouth.      latanoprost (XALATAN) 0.005 % ophthalmic solution Administer 1 Drop to both eyes nightly. ferrous sulfate 325 mg (65 mg iron) tablet Take  by mouth Daily (before breakfast). estradioL (VAGIFEM) 10 mcg tab vaginal tablet Insert 1 Tablet into vagina every Monday and Thursday.  (Patient not taking: Reported on 9/9/2022) 24 Tablet 4    OTHER seamoss         Allergies   Allergen Reactions    Bactrim [Sulfamethoprim] Nausea and Vomiting       Review of Systems - History obtained from the patient  Constitutional: negative for weight loss, fever, night sweats  HEENT: negative for hearing loss, earache, congestion, snoring, sorethroat  CV: negative for chest pain, palpitations, edema  Resp: negative for cough, shortness of breath, wheezing  GI: negative for change in bowel habits, abdominal pain, black or bloody stools  : negative for frequency, dysuria, hematuria, vaginal discharge  MSK: negative for back pain, joint pain, muscle pain  Breast: negative for breast lumps, nipple discharge, galactorrhea  Skin :negative for itching, rash, hives  Neuro: negative for dizziness, headache, confusion, weakness  Psych: negative for anxiety, depression, change in mood  Heme/lymph: negative for bleeding, bruising, pallor    Physical Exam    Visit Vitals  BP (!) 144/92   Ht 5' 6\" (1.676 m)   Wt 162 lb (73.5 kg)   BMI 26.15 kg/m² OBGyn Exam      Constitutional  Appearance: well-nourished, well developed, alert, in no acute distress    HENT  Head and Face: appears normal    Neck  Inspection/Palpation: normal appearance, no masses or tenderness  Thyroid: gland size normal, nontender    Chest  Respiratory Effort: non-labored breathing    Cardiovascular  Extremities: no peripheral edema    Gastrointestinal  Abdominal Examination: abdomen non-distended, non-tender to palpation, no masses present        Skin  General Inspection: no rash, no lesions identified    Neurologic/Psychiatric  Mental Status:  Orientation: grossly oriented to person, place and time  Mood and Affect: mood normal, affect appropriate      Assessment/Plan:    1. Cloudy urine  Check urine culture today. - CULTURE, URINE; Future  - CULTURE, URINE    2. Vaginal atrophy  She is hopeful for improved symptoms on the vaginal estrogens. Given the probable allergic reaction to vagifem, we discussed trying an alternate form of vaginal estrogen. Rx estrace cream sent.          Tito Lee MD

## 2022-09-12 ENCOUNTER — TELEPHONE (OUTPATIENT)
Dept: OBGYN CLINIC | Age: 62
End: 2022-09-12

## 2022-09-12 LAB
BACTERIA SPEC CULT: ABNORMAL
CC UR VC: ABNORMAL
SERVICE CMNT-IMP: ABNORMAL

## 2022-09-12 RX ORDER — NITROFURANTOIN 25; 75 MG/1; MG/1
100 CAPSULE ORAL 2 TIMES DAILY
Qty: 14 CAPSULE | Refills: 0 | Status: SHIPPED | OUTPATIENT
Start: 2022-09-12 | End: 2022-09-12 | Stop reason: SDUPTHER

## 2022-09-12 RX ORDER — NITROFURANTOIN 25; 75 MG/1; MG/1
100 CAPSULE ORAL 2 TIMES DAILY
Qty: 14 CAPSULE | Refills: 0 | Status: SHIPPED | OUTPATIENT
Start: 2022-09-12 | End: 2022-09-19

## 2022-09-12 NOTE — TELEPHONE ENCOUNTER
Pt called name and  verified. Pt is in [place] needs her medication to go to that pharmacy . Pt advised would resend to that pharmacy. Per md order ok to resend.

## 2022-09-13 NOTE — TELEPHONE ENCOUNTER
Patient calling back to check on the prescription and if it has been sent    Patient was advised that prescription was sent and receipt  confirmed on 9/12/2022    Patient verbalized understanding.

## 2022-09-14 DIAGNOSIS — R51.9 FREQUENT HEADACHES: ICD-10-CM

## 2022-09-14 DIAGNOSIS — I10 UNCONTROLLED HYPERTENSION: ICD-10-CM

## 2022-09-14 RX ORDER — AMLODIPINE BESYLATE 5 MG/1
TABLET ORAL
Qty: 30 TABLET | Refills: 0 | Status: SHIPPED | OUTPATIENT
Start: 2022-09-14

## 2022-09-18 DIAGNOSIS — H40.1130 PRIMARY OPEN ANGLE GLAUCOMA OF BOTH EYES, UNSPECIFIED GLAUCOMA STAGE: ICD-10-CM

## 2022-09-18 DIAGNOSIS — I10 ESSENTIAL HYPERTENSION: ICD-10-CM

## 2022-09-18 DIAGNOSIS — R51.9 FREQUENT HEADACHES: ICD-10-CM

## 2022-09-18 RX ORDER — ACETAZOLAMIDE 125 MG/1
TABLET ORAL
Qty: 120 TABLET | Refills: 0 | Status: SHIPPED | OUTPATIENT
Start: 2022-09-18 | End: 2022-10-15

## 2022-09-20 ENCOUNTER — OFFICE VISIT (OUTPATIENT)
Dept: PRIMARY CARE CLINIC | Age: 62
End: 2022-09-20
Payer: COMMERCIAL

## 2022-09-20 VITALS
RESPIRATION RATE: 16 BRPM | HEART RATE: 64 BPM | TEMPERATURE: 97.4 F | OXYGEN SATURATION: 98 % | SYSTOLIC BLOOD PRESSURE: 126 MMHG | BODY MASS INDEX: 25.62 KG/M2 | DIASTOLIC BLOOD PRESSURE: 78 MMHG | HEIGHT: 66 IN | WEIGHT: 159.4 LBS

## 2022-09-20 DIAGNOSIS — M34.9 SCLERODERMA (HCC): ICD-10-CM

## 2022-09-20 DIAGNOSIS — G44.219 FREQUENT EPISODIC TENSION-TYPE HEADACHE: ICD-10-CM

## 2022-09-20 DIAGNOSIS — I10 ESSENTIAL HYPERTENSION: Primary | ICD-10-CM

## 2022-09-20 DIAGNOSIS — H40.1130 PRIMARY OPEN ANGLE GLAUCOMA OF BOTH EYES, UNSPECIFIED GLAUCOMA STAGE: ICD-10-CM

## 2022-09-20 DIAGNOSIS — E55.9 VITAMIN D DEFICIENCY: ICD-10-CM

## 2022-09-20 PROCEDURE — 99214 OFFICE O/P EST MOD 30 MIN: CPT | Performed by: INTERNAL MEDICINE

## 2022-09-20 NOTE — PROGRESS NOTES
Mary Argueta (: 1960) is a 65 y.o. female, established patient, here for evaluation of the following chief complaint(s):  Reyna Doyle MD, personally performed the services described in this documentation as scribed by Mark Angel in my presence, and it is both accurate and complete. ASSESSMENT/PLAN:  Below is the assessment and plan developed based on review of pertinent history, physical exam, labs, studies, and medications. 1. Essential hypertension  Ordered fasting labs for pt to complete today in office. Waiting on results. BP is well-controlled on current medication. No change to dosage at this time.   -     METABOLIC PANEL, COMPREHENSIVE; Future  -     CBC WITH AUTOMATED DIFF; Future  -     LIPID PANEL; Future    2. Primary open angle glaucoma of both eyes, unspecified glaucoma stage  Continue on Diamox 125 mg 2 tab BID. Followed by Ophthalmology. 3. Scleroderma ([facility] [address]  Followed by Dr. Frida Mcknight (Rheumatology). Recheck BENNY as she ahs been feeling tired. Told her needs to get established with new Rheumatologist as Dr. Frida Mcknight had left the practice. -     BENNY, DIRECT, W/REFLEX; Future    4. Frequent episodic tension-type headache  Advised her to take otc medication. Explained that medication from time to time should not affect organ functions tremendously. 5. Vitamin D deficiency  Ordered lab work to check Vitamin D levels. Waiting for results. Recommend that patient take a Vitamin D supplement of 1,000 units daily. -     VITAMIN D, 25 HYDROXY; Future    SUBJECTIVE/OBJECTIVE:      The patient comes in today for a follow-up. She is fasting today for blood work. She has been going to the gym for exercise. She has lost weight from 162 to 159 lb today. She is followed by Ophthalmology for glaucoma. She is on Diamox 125 mg 2 tab BID. Her BP is good today, 126/78 on manual repeat. She is on amlodipine 5 mg daily and Diamox 125 mg 2 tab BID for HTN.  She notes dull HA due to stress and will take 0.5 tab of pain medication if pain lasts for more than a few days. She is worried of medication worsening organ functions. She notes a UTI last week. She was rx'd nitrofurantoin as directed by Dr. Catrachito Alcocer (OB/GYN). Her BENNY on  was positive and showed scleroderma Ab. She denies fatigue and joint pain. Followed by Dr. Lizbeth Jones (Rheumatology). Followed by Dr. Peter Clemens (GI) for GERD. Her colonoscopy was normal. She notes FMHx of colon cancer. She will repeat in 5 years. She notes hernia. Patient Active Problem List   Diagnosis Code    Primary open angle glaucoma (POAG) of both eyes H40.1130    Essential hypertension I10    Gastroesophageal reflux disease without esophagitis K21.9    Scleroderma ([facility] [address] M34.9        Current Outpatient Medications on File Prior to Visit   Medication Sig Dispense Refill    acetaZOLAMIDE (DIAMOX) 125 mg tablet TAKE TWO TABLETS BY MOUTH TWICE DAILY 120 Tablet 0    amLODIPine (NORVASC) 5 mg tablet TAKE 1 TABLET BY MOUTH ONE TIME DAILY 30 Tablet 0    cholecalciferol, vitamin D3, (VITAMIN D3 PO) Take  by mouth. ZINC PO Take  by mouth.      latanoprost (XALATAN) 0.005 % ophthalmic solution Administer 1 Drop to both eyes nightly. OTHER seamoss      ferrous sulfate 325 mg (65 mg iron) tablet Take  by mouth Daily (before breakfast). [] nitrofurantoin, macrocrystal-monohydrate, (MACROBID) 100 mg capsule Take 1 Capsule by mouth two (2) times a day for 7 days. 14 Capsule 0    estradioL (ESTRACE) 0.01 % (0.1 mg/gram) vaginal cream Insert 1 g into the vagina nightly for 2 weeks, then twice weekly at bedtime (Patient not taking: Reported on 2022) 42.5 g 4    estradioL (VAGIFEM) 10 mcg tab vaginal tablet Insert 1 Tablet into vagina every Monday and Thursday. (Patient not taking: No sig reported) 24 Tablet 4     No current facility-administered medications on file prior to visit.        Allergies   Allergen Reactions    Bactrim [Sulfamethoprim] Nausea and Vomiting       Past Medical History:   Diagnosis Date    Chronic tension headaches     Glaucoma     Hypertension        Past Surgical History:   Procedure Laterality Date    HX GYN      hysterectomy    HX TONSILLECTOMY  1967       Family History   Problem Relation Age of Onset    Parkinson's Disease Mother     Emphysema Father     Panic disorder Maternal Grandfather     Alcohol abuse Paternal Grandfather     Diabetes Sister     No Known Problems Brother     Thyroid Disease Brother        Social History     Socioeconomic History    Marital status: SINGLE     Spouse name: Not on file    Number of children: Not on file    Years of education: Not on file    Highest education level: Not on file   Occupational History    Not on file   Tobacco Use    Smoking status: Never    Smokeless tobacco: Never   Vaping Use    Vaping Use: Never used   Substance and Sexual Activity    Alcohol use: No    Drug use: No    Sexual activity: Not Currently   Other Topics Concern    Not on file   Social History Narrative    Not on file     Office Visit on 09/09/2022   Component Date Value Ref Range Status    Special Requests: 09/09/2022 NO SPECIAL REQUESTS    Final    [place] Count 09/09/2022     Final                    Value:>100,000  COLONIES/mL      Culture result: 09/09/2022 ESCHERICHIA COLI (A)   Final      Review of Systems   Constitutional:  Negative for activity change, fatigue and unexpected weight change. HENT:  Negative for congestion, hearing loss, rhinorrhea and sore throat. Eyes:  Negative for discharge. Respiratory:  Negative for cough, chest tightness and shortness of breath. Cardiovascular:  Negative for leg swelling. Gastrointestinal:  Negative for abdominal pain, constipation and diarrhea. Genitourinary:  Negative for dysuria, flank pain, frequency and urgency. Musculoskeletal:  Negative for arthralgias, back pain and myalgias. Skin:  Negative for color change and rash. Neurological:  Negative for dizziness, light-headedness and headaches. Psychiatric/Behavioral:  Negative for dysphoric mood and sleep disturbance. The patient is not nervous/anxious. Visit Vitals  /78 (BP 1 Location: Right arm, BP Patient Position: Sitting)   Pulse 64   Temp 97.4 °F (36.3 °C)   Resp 16   Ht 5' 6\" (1.676 m)   Wt 159 lb 6.4 oz (72.3 kg)   SpO2 98%   BMI 25.73 kg/m²      Physical Exam  Vitals and nursing note reviewed. Constitutional:       General: She is not in acute distress. Appearance: Normal appearance. She is well-developed. She is not diaphoretic. HENT:      Right Ear: External ear normal.      Left Ear: External ear normal.   Eyes:      General: No scleral icterus. Right eye: No discharge. Left eye: No discharge. Extraocular Movements: Extraocular movements intact. Conjunctiva/sclera: Conjunctivae normal.   Cardiovascular:      Rate and Rhythm: Normal rate and regular rhythm. Pulmonary:      Effort: Pulmonary effort is normal.      Breath sounds: Normal breath sounds. No wheezing. Abdominal:      General: Bowel sounds are normal.      Palpations: Abdomen is soft. Tenderness: There is no abdominal tenderness. Musculoskeletal:      Cervical back: Normal range of motion and neck supple. Lymphadenopathy:      Cervical: No cervical adenopathy. Neurological:      Mental Status: She is alert and oriented to person, place, and time. Psychiatric:         Mood and Affect: Mood and affect normal.       An electronic signature was used to authenticate this note.   -- Per Alvarez

## 2022-10-15 DIAGNOSIS — I10 ESSENTIAL HYPERTENSION: ICD-10-CM

## 2022-10-15 DIAGNOSIS — R51.9 FREQUENT HEADACHES: ICD-10-CM

## 2022-10-15 DIAGNOSIS — H40.1130 PRIMARY OPEN ANGLE GLAUCOMA OF BOTH EYES, UNSPECIFIED GLAUCOMA STAGE: ICD-10-CM

## 2022-10-15 RX ORDER — ACETAZOLAMIDE 125 MG/1
TABLET ORAL
Qty: 120 TABLET | Refills: 0 | Status: SHIPPED | OUTPATIENT
Start: 2022-10-15 | End: 2022-10-16

## 2022-10-16 DIAGNOSIS — I10 ESSENTIAL HYPERTENSION: ICD-10-CM

## 2022-10-16 DIAGNOSIS — R51.9 FREQUENT HEADACHES: ICD-10-CM

## 2022-10-16 DIAGNOSIS — H40.1130 PRIMARY OPEN ANGLE GLAUCOMA OF BOTH EYES, UNSPECIFIED GLAUCOMA STAGE: ICD-10-CM

## 2022-10-16 RX ORDER — ACETAZOLAMIDE 125 MG/1
TABLET ORAL
Qty: 120 TABLET | Refills: 0 | Status: SHIPPED | OUTPATIENT
Start: 2022-10-16 | End: 2022-10-16

## 2022-10-16 RX ORDER — ACETAZOLAMIDE 125 MG/1
TABLET ORAL
Qty: 120 TABLET | Refills: 0 | Status: SHIPPED | OUTPATIENT
Start: 2022-10-16

## 2022-11-16 DIAGNOSIS — H40.1130 PRIMARY OPEN ANGLE GLAUCOMA OF BOTH EYES, UNSPECIFIED GLAUCOMA STAGE: ICD-10-CM

## 2022-11-16 DIAGNOSIS — I10 ESSENTIAL HYPERTENSION: ICD-10-CM

## 2022-11-16 DIAGNOSIS — R51.9 FREQUENT HEADACHES: ICD-10-CM

## 2022-11-16 RX ORDER — ACETAZOLAMIDE 125 MG/1
TABLET ORAL
Qty: 120 TABLET | Refills: 0 | Status: SHIPPED | OUTPATIENT
Start: 2022-11-16

## 2023-01-08 DIAGNOSIS — R51.9 FREQUENT HEADACHES: ICD-10-CM

## 2023-01-08 DIAGNOSIS — I10 ESSENTIAL HYPERTENSION: ICD-10-CM

## 2023-01-08 DIAGNOSIS — H40.1130 PRIMARY OPEN ANGLE GLAUCOMA OF BOTH EYES, UNSPECIFIED GLAUCOMA STAGE: ICD-10-CM

## 2023-01-09 RX ORDER — ACETAZOLAMIDE 125 MG/1
TABLET ORAL
Qty: 120 TABLET | Refills: 0 | Status: SHIPPED | OUTPATIENT
Start: 2023-01-09

## 2023-02-07 DIAGNOSIS — R51.9 FREQUENT HEADACHES: ICD-10-CM

## 2023-02-07 DIAGNOSIS — H40.1130 PRIMARY OPEN ANGLE GLAUCOMA OF BOTH EYES, UNSPECIFIED GLAUCOMA STAGE: ICD-10-CM

## 2023-02-07 DIAGNOSIS — I10 ESSENTIAL HYPERTENSION: ICD-10-CM

## 2023-02-07 RX ORDER — ACETAZOLAMIDE 125 MG/1
TABLET ORAL
Qty: 120 TABLET | Refills: 0 | Status: SHIPPED | OUTPATIENT
Start: 2023-02-07

## 2023-05-05 DIAGNOSIS — R51.9 FREQUENT HEADACHES: ICD-10-CM

## 2023-05-05 DIAGNOSIS — I10 ESSENTIAL HYPERTENSION: ICD-10-CM

## 2023-05-05 DIAGNOSIS — H40.1130 PRIMARY OPEN ANGLE GLAUCOMA OF BOTH EYES, UNSPECIFIED GLAUCOMA STAGE: ICD-10-CM

## 2023-05-05 RX ORDER — ACETAZOLAMIDE 125 MG/1
TABLET ORAL
Qty: 120 TABLET | Refills: 0 | Status: SHIPPED | OUTPATIENT
Start: 2023-05-05

## 2023-06-07 DIAGNOSIS — H40.1130 PRIMARY OPEN ANGLE GLAUCOMA OF BOTH EYES, UNSPECIFIED GLAUCOMA STAGE: Primary | ICD-10-CM

## 2023-06-07 RX ORDER — ACETAZOLAMIDE 125 MG/1
TABLET ORAL
Qty: 120 TABLET | Refills: 0 | Status: SHIPPED | OUTPATIENT
Start: 2023-06-07

## 2023-07-03 DIAGNOSIS — H40.1130 PRIMARY OPEN ANGLE GLAUCOMA OF BOTH EYES, UNSPECIFIED GLAUCOMA STAGE: ICD-10-CM

## 2023-07-04 RX ORDER — ACETAZOLAMIDE 125 MG/1
TABLET ORAL
Qty: 120 TABLET | Refills: 0 | Status: SHIPPED | OUTPATIENT
Start: 2023-07-04

## 2023-08-09 DIAGNOSIS — H40.1130 PRIMARY OPEN ANGLE GLAUCOMA OF BOTH EYES, UNSPECIFIED GLAUCOMA STAGE: ICD-10-CM

## 2023-08-09 RX ORDER — ACETAZOLAMIDE 125 MG/1
TABLET ORAL
Qty: 120 TABLET | Refills: 0 | OUTPATIENT
Start: 2023-08-09

## 2023-08-30 ENCOUNTER — OFFICE VISIT (OUTPATIENT)
Dept: PRIMARY CARE CLINIC | Facility: CLINIC | Age: 63
End: 2023-08-30
Payer: COMMERCIAL

## 2023-08-30 VITALS
RESPIRATION RATE: 18 BRPM | HEART RATE: 57 BPM | TEMPERATURE: 97.1 F | SYSTOLIC BLOOD PRESSURE: 138 MMHG | WEIGHT: 164.8 LBS | OXYGEN SATURATION: 98 % | HEIGHT: 66 IN | DIASTOLIC BLOOD PRESSURE: 86 MMHG | BODY MASS INDEX: 26.48 KG/M2

## 2023-08-30 DIAGNOSIS — Z90.710 S/P HYSTERECTOMY: ICD-10-CM

## 2023-08-30 DIAGNOSIS — E55.9 VITAMIN D DEFICIENCY: ICD-10-CM

## 2023-08-30 DIAGNOSIS — Z13.1 DIABETES MELLITUS SCREENING: ICD-10-CM

## 2023-08-30 DIAGNOSIS — M34.9 SCLERODERMA (HCC): ICD-10-CM

## 2023-08-30 DIAGNOSIS — Z12.31 ENCOUNTER FOR SCREENING MAMMOGRAM FOR MALIGNANT NEOPLASM OF BREAST: ICD-10-CM

## 2023-08-30 DIAGNOSIS — H40.1130 PRIMARY OPEN ANGLE GLAUCOMA OF BOTH EYES, UNSPECIFIED GLAUCOMA STAGE: ICD-10-CM

## 2023-08-30 DIAGNOSIS — I10 ESSENTIAL HYPERTENSION: ICD-10-CM

## 2023-08-30 DIAGNOSIS — F32.9 REACTIVE DEPRESSION: ICD-10-CM

## 2023-08-30 DIAGNOSIS — Z11.4 ENCOUNTER FOR SCREENING FOR HIV: ICD-10-CM

## 2023-08-30 DIAGNOSIS — I10 ESSENTIAL HYPERTENSION: Primary | ICD-10-CM

## 2023-08-30 LAB
25(OH)D3 SERPL-MCNC: 23.8 NG/ML (ref 30–100)
ALBUMIN SERPL-MCNC: 3.7 G/DL (ref 3.5–5)
ALBUMIN/GLOB SERPL: 1 (ref 1.1–2.2)
ALP SERPL-CCNC: 144 U/L (ref 45–117)
ALT SERPL-CCNC: 16 U/L (ref 12–78)
ANION GAP SERPL CALC-SCNC: 4 MMOL/L (ref 5–15)
AST SERPL-CCNC: 12 U/L (ref 15–37)
BILIRUB SERPL-MCNC: 0.6 MG/DL (ref 0.2–1)
BUN SERPL-MCNC: 12 MG/DL (ref 6–20)
BUN/CREAT SERPL: 12 (ref 12–20)
CALCIUM SERPL-MCNC: 9.1 MG/DL (ref 8.5–10.1)
CHLORIDE SERPL-SCNC: 113 MMOL/L (ref 97–108)
CO2 SERPL-SCNC: 24 MMOL/L (ref 21–32)
CREAT SERPL-MCNC: 0.98 MG/DL (ref 0.55–1.02)
ERYTHROCYTE [DISTWIDTH] IN BLOOD BY AUTOMATED COUNT: 14.4 % (ref 11.5–14.5)
EST. AVERAGE GLUCOSE BLD GHB EST-MCNC: 117 MG/DL
GLOBULIN SER CALC-MCNC: 3.7 G/DL (ref 2–4)
GLUCOSE SERPL-MCNC: 98 MG/DL (ref 65–100)
HBA1C MFR BLD: 5.7 % (ref 4–5.6)
HCT VFR BLD AUTO: 40 % (ref 35–47)
HGB BLD-MCNC: 12.7 G/DL (ref 11.5–16)
HIV 1+2 AB+HIV1 P24 AG SERPL QL IA: NONREACTIVE
HIV 1/2 RESULT COMMENT: NORMAL
MCH RBC QN AUTO: 29.6 PG (ref 26–34)
MCHC RBC AUTO-ENTMCNC: 31.8 G/DL (ref 30–36.5)
MCV RBC AUTO: 93.2 FL (ref 80–99)
NRBC # BLD: 0 K/UL (ref 0–0.01)
NRBC BLD-RTO: 0 PER 100 WBC
PLATELET # BLD AUTO: 228 K/UL (ref 150–400)
PMV BLD AUTO: 12.3 FL (ref 8.9–12.9)
POTASSIUM SERPL-SCNC: 3.6 MMOL/L (ref 3.5–5.1)
PROT SERPL-MCNC: 7.4 G/DL (ref 6.4–8.2)
RBC # BLD AUTO: 4.29 M/UL (ref 3.8–5.2)
SODIUM SERPL-SCNC: 141 MMOL/L (ref 136–145)
WBC # BLD AUTO: 5.7 K/UL (ref 3.6–11)

## 2023-08-30 PROCEDURE — 99214 OFFICE O/P EST MOD 30 MIN: CPT | Performed by: INTERNAL MEDICINE

## 2023-08-30 PROCEDURE — 3075F SYST BP GE 130 - 139MM HG: CPT | Performed by: INTERNAL MEDICINE

## 2023-08-30 PROCEDURE — 3079F DIAST BP 80-89 MM HG: CPT | Performed by: INTERNAL MEDICINE

## 2023-08-30 RX ORDER — ACETAZOLAMIDE 125 MG/1
TABLET ORAL
Qty: 120 TABLET | Refills: 0 | Status: CANCELLED | OUTPATIENT
Start: 2023-08-30

## 2023-08-30 RX ORDER — AMLODIPINE BESYLATE 5 MG/1
5 TABLET ORAL DAILY
Qty: 90 TABLET | Refills: 0 | Status: CANCELLED | OUTPATIENT
Start: 2023-08-30

## 2023-08-30 RX ORDER — SERTRALINE HYDROCHLORIDE 25 MG/1
25 TABLET, FILM COATED ORAL DAILY
Qty: 30 TABLET | Refills: 1 | Status: SHIPPED | OUTPATIENT
Start: 2023-08-30

## 2023-08-30 RX ORDER — AMLODIPINE BESYLATE 5 MG/1
5 TABLET ORAL DAILY
Qty: 90 TABLET | Refills: 1 | Status: SHIPPED | OUTPATIENT
Start: 2023-08-30 | End: 2024-02-26

## 2023-08-30 RX ORDER — ACETAZOLAMIDE 125 MG/1
TABLET ORAL
Qty: 120 TABLET | Refills: 1 | Status: SHIPPED | OUTPATIENT
Start: 2023-08-30

## 2023-08-30 SDOH — ECONOMIC STABILITY: HOUSING INSECURITY
IN THE LAST 12 MONTHS, WAS THERE A TIME WHEN YOU DID NOT HAVE A STEADY PLACE TO SLEEP OR SLEPT IN A SHELTER (INCLUDING NOW)?: NO

## 2023-08-30 SDOH — ECONOMIC STABILITY: FOOD INSECURITY: WITHIN THE PAST 12 MONTHS, THE FOOD YOU BOUGHT JUST DIDN'T LAST AND YOU DIDN'T HAVE MONEY TO GET MORE.: PATIENT DECLINED

## 2023-08-30 SDOH — ECONOMIC STABILITY: FOOD INSECURITY: WITHIN THE PAST 12 MONTHS, YOU WORRIED THAT YOUR FOOD WOULD RUN OUT BEFORE YOU GOT MONEY TO BUY MORE.: PATIENT DECLINED

## 2023-08-30 SDOH — ECONOMIC STABILITY: INCOME INSECURITY: HOW HARD IS IT FOR YOU TO PAY FOR THE VERY BASICS LIKE FOOD, HOUSING, MEDICAL CARE, AND HEATING?: PATIENT DECLINED

## 2023-08-30 ASSESSMENT — ENCOUNTER SYMPTOMS
CHEST TIGHTNESS: 0
BACK PAIN: 0
COLOR CHANGE: 0
ABDOMINAL PAIN: 0
CONSTIPATION: 0
SORE THROAT: 0
SHORTNESS OF BREATH: 0
EYE DISCHARGE: 0
DIARRHEA: 0
COUGH: 0
RHINORRHEA: 0

## 2023-08-30 ASSESSMENT — PATIENT HEALTH QUESTIONNAIRE - PHQ9
SUM OF ALL RESPONSES TO PHQ QUESTIONS 1-9: 2
2. FEELING DOWN, DEPRESSED OR HOPELESS: 1
1. LITTLE INTEREST OR PLEASURE IN DOING THINGS: 1
SUM OF ALL RESPONSES TO PHQ QUESTIONS 1-9: 2
SUM OF ALL RESPONSES TO PHQ9 QUESTIONS 1 & 2: 2
SUM OF ALL RESPONSES TO PHQ QUESTIONS 1-9: 2
SUM OF ALL RESPONSES TO PHQ QUESTIONS 1-9: 2

## 2023-11-14 DIAGNOSIS — H40.1130 PRIMARY OPEN ANGLE GLAUCOMA OF BOTH EYES, UNSPECIFIED GLAUCOMA STAGE: ICD-10-CM

## 2023-11-15 RX ORDER — ACETAZOLAMIDE 125 MG/1
TABLET ORAL
Qty: 120 TABLET | Refills: 0 | Status: SHIPPED | OUTPATIENT
Start: 2023-11-15

## 2023-12-16 DIAGNOSIS — H40.1130 PRIMARY OPEN ANGLE GLAUCOMA OF BOTH EYES, UNSPECIFIED GLAUCOMA STAGE: ICD-10-CM

## 2023-12-16 RX ORDER — ACETAZOLAMIDE 125 MG/1
TABLET ORAL
Qty: 120 TABLET | Refills: 0 | Status: SHIPPED | OUTPATIENT
Start: 2023-12-16

## 2024-01-10 ENCOUNTER — HOSPITAL ENCOUNTER (OUTPATIENT)
Facility: HOSPITAL | Age: 64
Discharge: HOME | End: 2024-01-13
Payer: COMMERCIAL

## 2024-01-10 VITALS — WEIGHT: 161.6 LBS | BODY MASS INDEX: 25.97 KG/M2 | HEIGHT: 66 IN

## 2024-01-10 DIAGNOSIS — Z12.31 ENCOUNTER FOR SCREENING MAMMOGRAM FOR MALIGNANT NEOPLASM OF BREAST: ICD-10-CM

## 2024-01-10 PROCEDURE — 77063 BREAST TOMOSYNTHESIS BI: CPT

## 2024-01-15 DIAGNOSIS — H40.1130 PRIMARY OPEN ANGLE GLAUCOMA OF BOTH EYES, UNSPECIFIED GLAUCOMA STAGE: ICD-10-CM

## 2024-01-15 RX ORDER — ACETAZOLAMIDE 125 MG/1
TABLET ORAL
Qty: 120 TABLET | Refills: 0 | Status: SHIPPED | OUTPATIENT
Start: 2024-01-15

## 2024-03-04 DIAGNOSIS — H40.1130 PRIMARY OPEN ANGLE GLAUCOMA OF BOTH EYES, UNSPECIFIED GLAUCOMA STAGE: ICD-10-CM

## 2024-03-04 RX ORDER — ACETAZOLAMIDE 125 MG/1
250 TABLET ORAL 2 TIMES DAILY
Qty: 120 TABLET | Refills: 0 | Status: SHIPPED | OUTPATIENT
Start: 2024-03-04

## 2024-04-03 DIAGNOSIS — H40.1130 PRIMARY OPEN ANGLE GLAUCOMA OF BOTH EYES, UNSPECIFIED GLAUCOMA STAGE: ICD-10-CM

## 2024-04-03 RX ORDER — ACETAZOLAMIDE 125 MG/1
TABLET ORAL
Qty: 120 TABLET | Refills: 0 | Status: SHIPPED | OUTPATIENT
Start: 2024-04-03

## 2024-05-05 DIAGNOSIS — H40.1130 PRIMARY OPEN ANGLE GLAUCOMA OF BOTH EYES, UNSPECIFIED GLAUCOMA STAGE: ICD-10-CM

## 2024-05-05 RX ORDER — ACETAZOLAMIDE 125 MG/1
TABLET ORAL
Qty: 120 TABLET | Refills: 0 | Status: SHIPPED | OUTPATIENT
Start: 2024-05-05

## 2024-07-11 DIAGNOSIS — H40.1130 PRIMARY OPEN ANGLE GLAUCOMA OF BOTH EYES, UNSPECIFIED GLAUCOMA STAGE: ICD-10-CM

## 2024-07-11 RX ORDER — ACETAZOLAMIDE 125 MG/1
TABLET ORAL
Qty: 120 TABLET | Refills: 0 | Status: SHIPPED | OUTPATIENT
Start: 2024-07-11

## 2024-08-25 DIAGNOSIS — H40.1130 PRIMARY OPEN ANGLE GLAUCOMA OF BOTH EYES, UNSPECIFIED GLAUCOMA STAGE: ICD-10-CM

## 2024-08-25 RX ORDER — ACETAZOLAMIDE 125 MG/1
125 TABLET ORAL 2 TIMES DAILY
Qty: 60 TABLET | Refills: 0 | Status: SHIPPED | OUTPATIENT
Start: 2024-08-25 | End: 2024-09-24

## 2024-09-23 DIAGNOSIS — H40.1130 PRIMARY OPEN ANGLE GLAUCOMA OF BOTH EYES, UNSPECIFIED GLAUCOMA STAGE: ICD-10-CM

## 2024-09-23 RX ORDER — ACETAZOLAMIDE 125 MG/1
125 TABLET ORAL 2 TIMES DAILY
Qty: 60 TABLET | Refills: 0 | Status: SHIPPED | OUTPATIENT
Start: 2024-09-23

## 2024-10-23 DIAGNOSIS — H40.1130 PRIMARY OPEN ANGLE GLAUCOMA OF BOTH EYES, UNSPECIFIED GLAUCOMA STAGE: ICD-10-CM

## 2024-10-23 RX ORDER — ACETAZOLAMIDE 125 MG/1
125 TABLET ORAL 2 TIMES DAILY
Qty: 60 TABLET | Refills: 0 | Status: SHIPPED | OUTPATIENT
Start: 2024-10-23

## 2024-11-26 ENCOUNTER — OFFICE VISIT (OUTPATIENT)
Dept: PRIMARY CARE CLINIC | Facility: CLINIC | Age: 64
End: 2024-11-26
Payer: COMMERCIAL

## 2024-11-26 VITALS
HEART RATE: 65 BPM | SYSTOLIC BLOOD PRESSURE: 152 MMHG | TEMPERATURE: 97.8 F | WEIGHT: 176.8 LBS | RESPIRATION RATE: 16 BRPM | OXYGEN SATURATION: 99 % | BODY MASS INDEX: 28.42 KG/M2 | DIASTOLIC BLOOD PRESSURE: 86 MMHG | HEIGHT: 66 IN

## 2024-11-26 DIAGNOSIS — E55.9 VITAMIN D DEFICIENCY: ICD-10-CM

## 2024-11-26 DIAGNOSIS — R53.83 OTHER FATIGUE: ICD-10-CM

## 2024-11-26 DIAGNOSIS — H40.1130 PRIMARY OPEN ANGLE GLAUCOMA OF BOTH EYES, UNSPECIFIED GLAUCOMA STAGE: ICD-10-CM

## 2024-11-26 DIAGNOSIS — I10 PRIMARY HYPERTENSION: Primary | ICD-10-CM

## 2024-11-26 DIAGNOSIS — M34.9 SCLERODERMA (HCC): ICD-10-CM

## 2024-11-26 DIAGNOSIS — Z11.59 NEED FOR HEPATITIS B SCREENING TEST: ICD-10-CM

## 2024-11-26 DIAGNOSIS — R73.02 IGT (IMPAIRED GLUCOSE TOLERANCE): ICD-10-CM

## 2024-11-26 DIAGNOSIS — R01.1 HEART MURMUR: ICD-10-CM

## 2024-11-26 DIAGNOSIS — M81.0 POSTMENOPAUSAL BONE LOSS: ICD-10-CM

## 2024-11-26 PROCEDURE — 3077F SYST BP >= 140 MM HG: CPT | Performed by: INTERNAL MEDICINE

## 2024-11-26 PROCEDURE — 99214 OFFICE O/P EST MOD 30 MIN: CPT | Performed by: INTERNAL MEDICINE

## 2024-11-26 PROCEDURE — 3079F DIAST BP 80-89 MM HG: CPT | Performed by: INTERNAL MEDICINE

## 2024-11-26 RX ORDER — ACETAZOLAMIDE 125 MG/1
125 TABLET ORAL 2 TIMES DAILY
Qty: 60 TABLET | Refills: 0 | Status: SHIPPED | OUTPATIENT
Start: 2024-11-26

## 2024-11-26 RX ORDER — AMLODIPINE BESYLATE 5 MG/1
5 TABLET ORAL DAILY
Qty: 30 TABLET | Refills: 3 | Status: SHIPPED | OUTPATIENT
Start: 2024-11-26

## 2024-11-26 SDOH — ECONOMIC STABILITY: FOOD INSECURITY: WITHIN THE PAST 12 MONTHS, YOU WORRIED THAT YOUR FOOD WOULD RUN OUT BEFORE YOU GOT MONEY TO BUY MORE.: PATIENT DECLINED

## 2024-11-26 SDOH — ECONOMIC STABILITY: FOOD INSECURITY: WITHIN THE PAST 12 MONTHS, THE FOOD YOU BOUGHT JUST DIDN'T LAST AND YOU DIDN'T HAVE MONEY TO GET MORE.: PATIENT DECLINED

## 2024-11-26 SDOH — ECONOMIC STABILITY: INCOME INSECURITY: HOW HARD IS IT FOR YOU TO PAY FOR THE VERY BASICS LIKE FOOD, HOUSING, MEDICAL CARE, AND HEATING?: PATIENT DECLINED

## 2024-11-26 ASSESSMENT — PATIENT HEALTH QUESTIONNAIRE - PHQ9
2. FEELING DOWN, DEPRESSED OR HOPELESS: NOT AT ALL
SUM OF ALL RESPONSES TO PHQ QUESTIONS 1-9: 0
1. LITTLE INTEREST OR PLEASURE IN DOING THINGS: NOT AT ALL
SUM OF ALL RESPONSES TO PHQ QUESTIONS 1-9: 0
5. POOR APPETITE OR OVEREATING: NOT AT ALL
SUM OF ALL RESPONSES TO PHQ QUESTIONS 1-9: 0
1. LITTLE INTEREST OR PLEASURE IN DOING THINGS: NOT AT ALL
4. FEELING TIRED OR HAVING LITTLE ENERGY: NOT AT ALL
SUM OF ALL RESPONSES TO PHQ9 QUESTIONS 1 & 2: 0
6. FEELING BAD ABOUT YOURSELF - OR THAT YOU ARE A FAILURE OR HAVE LET YOURSELF OR YOUR FAMILY DOWN: NOT AT ALL
SUM OF ALL RESPONSES TO PHQ QUESTIONS 1-9: 0
SUM OF ALL RESPONSES TO PHQ QUESTIONS 1-9: 0
8. MOVING OR SPEAKING SO SLOWLY THAT OTHER PEOPLE COULD HAVE NOTICED. OR THE OPPOSITE, BEING SO FIGETY OR RESTLESS THAT YOU HAVE BEEN MOVING AROUND A LOT MORE THAN USUAL: NOT AT ALL
SUM OF ALL RESPONSES TO PHQ QUESTIONS 1-9: 0
2. FEELING DOWN, DEPRESSED OR HOPELESS: NOT AT ALL
SUM OF ALL RESPONSES TO PHQ9 QUESTIONS 1 & 2: 0
9. THOUGHTS THAT YOU WOULD BE BETTER OFF DEAD, OR OF HURTING YOURSELF: NOT AT ALL
7. TROUBLE CONCENTRATING ON THINGS, SUCH AS READING THE NEWSPAPER OR WATCHING TELEVISION: NOT AT ALL
3. TROUBLE FALLING OR STAYING ASLEEP: NOT AT ALL

## 2024-11-26 ASSESSMENT — ENCOUNTER SYMPTOMS
CHEST TIGHTNESS: 0
EYE DISCHARGE: 0
RHINORRHEA: 0
SORE THROAT: 0
DIARRHEA: 0
SHORTNESS OF BREATH: 0
CONSTIPATION: 0
COLOR CHANGE: 0
BACK PAIN: 0
ABDOMINAL PAIN: 0
COUGH: 0

## 2024-12-11 ENCOUNTER — HOSPITAL ENCOUNTER (OUTPATIENT)
Facility: HOSPITAL | Age: 64
Discharge: HOME | End: 2024-12-14
Attending: INTERNAL MEDICINE
Payer: COMMERCIAL

## 2024-12-11 VITALS — WEIGHT: 176 LBS | HEIGHT: 66 IN | BODY MASS INDEX: 28.28 KG/M2

## 2024-12-11 DIAGNOSIS — M81.0 POSTMENOPAUSAL BONE LOSS: ICD-10-CM

## 2024-12-11 PROCEDURE — 77080 DXA BONE DENSITY AXIAL: CPT

## 2024-12-22 DIAGNOSIS — I10 PRIMARY HYPERTENSION: ICD-10-CM

## 2024-12-22 DIAGNOSIS — H40.1130 PRIMARY OPEN ANGLE GLAUCOMA OF BOTH EYES, UNSPECIFIED GLAUCOMA STAGE: ICD-10-CM

## 2024-12-22 RX ORDER — ACETAZOLAMIDE 125 MG/1
125 TABLET ORAL 2 TIMES DAILY
Qty: 60 TABLET | Refills: 0 | Status: SHIPPED | OUTPATIENT
Start: 2024-12-22

## 2025-01-18 DIAGNOSIS — H40.1130 PRIMARY OPEN ANGLE GLAUCOMA OF BOTH EYES, UNSPECIFIED GLAUCOMA STAGE: ICD-10-CM

## 2025-01-18 DIAGNOSIS — I10 PRIMARY HYPERTENSION: ICD-10-CM

## 2025-01-18 RX ORDER — ACETAZOLAMIDE 125 MG/1
125 TABLET ORAL 2 TIMES DAILY
Qty: 60 TABLET | Refills: 0 | Status: SHIPPED | OUTPATIENT
Start: 2025-01-18

## 2025-01-20 ENCOUNTER — OFFICE VISIT (OUTPATIENT)
Age: 65
End: 2025-01-20
Payer: COMMERCIAL

## 2025-01-20 VITALS
OXYGEN SATURATION: 99 % | BODY MASS INDEX: 28.28 KG/M2 | WEIGHT: 176 LBS | HEIGHT: 66 IN | SYSTOLIC BLOOD PRESSURE: 124 MMHG | DIASTOLIC BLOOD PRESSURE: 80 MMHG | HEART RATE: 75 BPM

## 2025-01-20 DIAGNOSIS — R01.1 MURMUR, CARDIAC: ICD-10-CM

## 2025-01-20 DIAGNOSIS — I10 ESSENTIAL HYPERTENSION: Primary | ICD-10-CM

## 2025-01-20 DIAGNOSIS — R07.9 CHEST PAIN, UNSPECIFIED TYPE: ICD-10-CM

## 2025-01-20 PROCEDURE — 3074F SYST BP LT 130 MM HG: CPT | Performed by: INTERNAL MEDICINE

## 2025-01-20 PROCEDURE — 99204 OFFICE O/P NEW MOD 45 MIN: CPT | Performed by: INTERNAL MEDICINE

## 2025-01-20 PROCEDURE — 3079F DIAST BP 80-89 MM HG: CPT | Performed by: INTERNAL MEDICINE

## 2025-01-20 PROCEDURE — 93000 ELECTROCARDIOGRAM COMPLETE: CPT | Performed by: INTERNAL MEDICINE

## 2025-01-20 ASSESSMENT — PATIENT HEALTH QUESTIONNAIRE - PHQ9
SUM OF ALL RESPONSES TO PHQ9 QUESTIONS 1 & 2: 0
SUM OF ALL RESPONSES TO PHQ QUESTIONS 1-9: 0
2. FEELING DOWN, DEPRESSED OR HOPELESS: NOT AT ALL
SUM OF ALL RESPONSES TO PHQ QUESTIONS 1-9: 0
SUM OF ALL RESPONSES TO PHQ QUESTIONS 1-9: 0
1. LITTLE INTEREST OR PLEASURE IN DOING THINGS: NOT AT ALL
SUM OF ALL RESPONSES TO PHQ QUESTIONS 1-9: 0

## 2025-01-20 NOTE — PATIENT INSTRUCTIONS
Dear Samira,    Thank you for visiting my office today. Your commitment to enhancing your health and effectively managing your conditions is highly valued.    Following our consultation, here are the essential instructions and recommendations for your care plan:    Medications:    Continue taking Amlodipine 5 mg once daily.    Continue with Diamox 125 mg twice a day. Please monitor your blood pressure regularly and inform us if readings are consistently high.    Dietary Recommendations:    Reduce salt intake and avoid processed, canned, and restaurant foods.    Incorporate more greens and beet juice into your diet.    Lifestyle and Activity:    Resume regular exercise to aid in weight loss and overall health.    Tests and Monitoring:    An exercise stress test is scheduled to evaluate the chest pressure you described.    An echocardiogram will be performed to assess your heart valves.    Follow up with Dr. Red regarding the potential need for an endoscopy if reflux symptoms persist.    Follow-Up Care:    Schedule a follow-up appointment with me in 4 months to reassess your condition.    Your health and well-being are our top priority. Please ensure to follow these instructions carefully and reach out if you have any questions or concerns before our next scheduled visit.    Best regards,    Alex Patten MD  Cardiology    You will be scheduled for a Treadmill Stress Test after your appointment today.    Please wear comfortable clothing (shorts or pants with a shirt or blouse) and walking/athletic shoes.    Do not eat or drink anything, except water, for at least 2 hours prior to your test.    Do take your scheduled medications prior to your test.

## 2025-01-20 NOTE — PROGRESS NOTES
1. Have you been to the ER, urgent care clinic since your last visit?  Hospitalized since your last visit?No    2. Have you seen or consulted any other health care providers outside of the [facility] since your last visit?  Include any pap smears or colon screening. No

## 2025-01-20 NOTE — PROGRESS NOTES
Cardiology  Clinic -   New Patient  Visit   Date of Service : 1/20/2025    Name: Samira Goss  Patient ID: [id number]  Age: 65 y.o. Sex: female YOB: 1960    Author: TORI BARROS MD    PCP: Gail Red MD    Referring Provider:Gail Red MD    Reason for Visit / CC:    Chief Complaint   Patient presents with    New Patient    Hypertension         ASSESSMENT  AND PLAN           Assessment & Plan  1. Hypertension.  Her hypertension has been well-managed since the initiation of amlodipine 5 mg once daily by Dr. Red. She has been self-monitoring her blood pressure at home, which has remained within the normal range since December 2024. No alterations to the current treatment plan are necessary. She is advised to continue with the current regimen of amlodipine 5 mg once daily and acetazolamide 125 mg orally twice daily, which also serves to manage her glaucoma. She is instructed to monitor her blood pressure at home and report any readings exceeding 140/90. Lifestyle modifications, including reduced salt intake, increased consumption of greens and beets, weight loss, and regular exercise, are encouraged.    2. Cardiac systolic murmur.  An echocardiogram will be conducted to assess for potential valvular heart disease and to evaluate the left ventricle's systolic and diastolic function.    3. Occasional episodes of retrosternal chest pressure.  Based on her clinical history, these episodes appear to be secondary to GERD, particularly as they occur during meals and radiate to the back. However, the possibility of esophageal spasm related to scleroderma and severe GERD can not be ruled out, nor can coronary artery disease. She is advised to consult with her primary care physician for further management of GERD. Protonix may be considered for acid relief. An exercise stress test will be performed to exclude ischemia. If the exercise stress test results are normal, a referral to a

## 2025-02-14 DIAGNOSIS — H40.1130 PRIMARY OPEN ANGLE GLAUCOMA OF BOTH EYES, UNSPECIFIED GLAUCOMA STAGE: ICD-10-CM

## 2025-02-14 DIAGNOSIS — I10 PRIMARY HYPERTENSION: ICD-10-CM

## 2025-02-14 RX ORDER — ACETAZOLAMIDE 125 MG/1
125 TABLET ORAL 2 TIMES DAILY
Qty: 60 TABLET | Refills: 0 | Status: SHIPPED | OUTPATIENT
Start: 2025-02-14

## 2025-02-25 ENCOUNTER — PATIENT MESSAGE (OUTPATIENT)
Age: 65
End: 2025-02-25

## 2025-03-15 DIAGNOSIS — I10 PRIMARY HYPERTENSION: ICD-10-CM

## 2025-03-15 RX ORDER — AMLODIPINE BESYLATE 5 MG/1
5 TABLET ORAL DAILY
Qty: 30 TABLET | Refills: 1 | Status: SHIPPED | OUTPATIENT
Start: 2025-03-15

## 2025-03-31 ENCOUNTER — TRANSCRIBE ORDERS (OUTPATIENT)
Facility: HOSPITAL | Age: 65
End: 2025-03-31

## 2025-03-31 DIAGNOSIS — Z12.31 OTHER SCREENING MAMMOGRAM: Primary | ICD-10-CM

## 2025-04-10 DIAGNOSIS — H40.1130 PRIMARY OPEN ANGLE GLAUCOMA OF BOTH EYES, UNSPECIFIED GLAUCOMA STAGE: ICD-10-CM

## 2025-04-10 DIAGNOSIS — I10 PRIMARY HYPERTENSION: ICD-10-CM

## 2025-04-10 RX ORDER — ACETAZOLAMIDE 125 MG/1
125 TABLET ORAL 2 TIMES DAILY
Qty: 60 TABLET | Refills: 1 | Status: SHIPPED | OUTPATIENT
Start: 2025-04-10

## 2025-05-06 DIAGNOSIS — I10 PRIMARY HYPERTENSION: ICD-10-CM

## 2025-05-06 RX ORDER — AMLODIPINE BESYLATE 5 MG/1
5 TABLET ORAL DAILY
Qty: 90 TABLET | Refills: 0 | Status: SHIPPED | OUTPATIENT
Start: 2025-05-06

## 2025-05-13 ENCOUNTER — RESULTS FOLLOW-UP (OUTPATIENT)
Age: 65
End: 2025-05-13

## 2025-05-13 ENCOUNTER — HOSPITAL ENCOUNTER (OUTPATIENT)
Facility: HOSPITAL | Age: 65
Discharge: HOME | End: 2025-05-16
Attending: OBSTETRICS & GYNECOLOGY
Payer: COMMERCIAL

## 2025-05-13 DIAGNOSIS — Z12.31 OTHER SCREENING MAMMOGRAM: ICD-10-CM

## 2025-05-13 PROCEDURE — 77063 BREAST TOMOSYNTHESIS BI: CPT

## 2025-06-04 DIAGNOSIS — H40.1130 PRIMARY OPEN ANGLE GLAUCOMA OF BOTH EYES, UNSPECIFIED GLAUCOMA STAGE: ICD-10-CM

## 2025-06-04 DIAGNOSIS — I10 PRIMARY HYPERTENSION: ICD-10-CM

## 2025-06-04 RX ORDER — ACETAZOLAMIDE 125 MG/1
125 TABLET ORAL 2 TIMES DAILY
Qty: 60 TABLET | Refills: 0 | Status: SHIPPED | OUTPATIENT
Start: 2025-06-04

## 2025-07-01 SDOH — ECONOMIC STABILITY: INCOME INSECURITY: IN THE LAST 12 MONTHS, WAS THERE A TIME WHEN YOU WERE NOT ABLE TO PAY THE MORTGAGE OR RENT ON TIME?: NO

## 2025-07-01 SDOH — ECONOMIC STABILITY: FOOD INSECURITY: WITHIN THE PAST 12 MONTHS, YOU WORRIED THAT YOUR FOOD WOULD RUN OUT BEFORE YOU GOT MONEY TO BUY MORE.: NEVER TRUE

## 2025-07-01 SDOH — ECONOMIC STABILITY: FOOD INSECURITY: WITHIN THE PAST 12 MONTHS, THE FOOD YOU BOUGHT JUST DIDN'T LAST AND YOU DIDN'T HAVE MONEY TO GET MORE.: NEVER TRUE

## 2025-07-01 SDOH — ECONOMIC STABILITY: TRANSPORTATION INSECURITY
IN THE PAST 12 MONTHS, HAS LACK OF TRANSPORTATION KEPT YOU FROM MEETINGS, WORK, OR FROM GETTING THINGS NEEDED FOR DAILY LIVING?: NO

## 2025-07-01 SDOH — ECONOMIC STABILITY: TRANSPORTATION INSECURITY
IN THE PAST 12 MONTHS, HAS THE LACK OF TRANSPORTATION KEPT YOU FROM MEDICAL APPOINTMENTS OR FROM GETTING MEDICATIONS?: NO

## 2025-07-02 ENCOUNTER — OFFICE VISIT (OUTPATIENT)
Dept: PRIMARY CARE CLINIC | Facility: CLINIC | Age: 65
End: 2025-07-02
Payer: COMMERCIAL

## 2025-07-02 VITALS
DIASTOLIC BLOOD PRESSURE: 78 MMHG | BODY MASS INDEX: 29.25 KG/M2 | TEMPERATURE: 97.2 F | SYSTOLIC BLOOD PRESSURE: 130 MMHG | RESPIRATION RATE: 16 BRPM | HEIGHT: 66 IN | WEIGHT: 182 LBS | HEART RATE: 76 BPM | OXYGEN SATURATION: 96 %

## 2025-07-02 DIAGNOSIS — I10 PRIMARY HYPERTENSION: Primary | ICD-10-CM

## 2025-07-02 DIAGNOSIS — R10.31 RIGHT LOWER QUADRANT ABDOMINAL PAIN: ICD-10-CM

## 2025-07-02 DIAGNOSIS — M34.9 SCLERODERMA (HCC): ICD-10-CM

## 2025-07-02 DIAGNOSIS — K21.9 GASTROESOPHAGEAL REFLUX DISEASE WITHOUT ESOPHAGITIS: ICD-10-CM

## 2025-07-02 PROCEDURE — 3075F SYST BP GE 130 - 139MM HG: CPT | Performed by: INTERNAL MEDICINE

## 2025-07-02 PROCEDURE — 99213 OFFICE O/P EST LOW 20 MIN: CPT | Performed by: INTERNAL MEDICINE

## 2025-07-02 PROCEDURE — 3078F DIAST BP <80 MM HG: CPT | Performed by: INTERNAL MEDICINE

## 2025-07-02 ASSESSMENT — PATIENT HEALTH QUESTIONNAIRE - PHQ9
SUM OF ALL RESPONSES TO PHQ QUESTIONS 1-9: 0
1. LITTLE INTEREST OR PLEASURE IN DOING THINGS: NOT AT ALL
SUM OF ALL RESPONSES TO PHQ QUESTIONS 1-9: 0
2. FEELING DOWN, DEPRESSED OR HOPELESS: NOT AT ALL

## 2025-07-02 NOTE — PROGRESS NOTES
Samira Goss (:  1960) is a 65 y.o. female,Established patient, here for evaluation of the following chief complaint(s):  Pelvic Pain    [facility] [place] [facility] PRIMARY CARE  [address] [id number]    History of Present Illness  The patient presents for evaluation of lower abdominal pain, scleroderma, and blood pressure management.    She has been experiencing pain in her lower abdomen for the past week, which she describes as similar to the discomfort she felt 30 years ago that led to her hysterectomy. The pain is primarily located on the right side and intensifies when she lies on her left side. She reports no changes in her diet or bowel habits such as constipation or diarrhea. She maintains a daily water intake of at least 60 ounces. She has not been diagnosed with diverticulitis.    She has not sought recent medical attention for her scleroderma, despite persistent fatigue. She also reports difficulty swallowing food.    She has been adhering to her prescribed medication regimen but does not monitor her blood pressure at home. She has noticed an increase in her weight, which she attributes to late-night eating due to her second job. She has been engaging in regular physical activity, walking 30 miles over a period of 30 days in 2025. She experiences headaches during humid weather conditions.    She is taking acetazolamide for her eye pressure.    PAST SURGICAL HISTORY:  Hysterectomy 30 years ago.    SOCIAL HISTORY  She works at [employer] and part-time at [employer].  Current Outpatient Medications   Medication Sig Dispense Refill    acetaZOLAMIDE (DIAMOX) 125 MG tablet TAKE 1 TABLET BY MOUTH TWICE DAILY 60 tablet 0    amLODIPine (NORVASC) 5 MG tablet TAKE 1 TABLET BY MOUTH ONE TIME DAILY 90 tablet 0    Cholecalciferol (VITAMIN D-3 PO) Take by mouth      ZINC PO Take by mouth      ferrous sulfate (IRON 325) 325 (65 Fe) MG tablet Take

## 2025-07-02 NOTE — PROGRESS NOTES
Have you been to the ER, urgent care clinic since your last visit?  Hospitalized since your last visit?   NO      Have you seen or consulted any other health care providers outside our system since your last visit?   [facility]       Chief Complaint   Patient presents with    Pelvic Pain

## 2025-07-05 DIAGNOSIS — H40.1130 PRIMARY OPEN ANGLE GLAUCOMA OF BOTH EYES, UNSPECIFIED GLAUCOMA STAGE: ICD-10-CM

## 2025-07-05 DIAGNOSIS — I10 PRIMARY HYPERTENSION: ICD-10-CM

## 2025-07-05 RX ORDER — ACETAZOLAMIDE 125 MG/1
125 TABLET ORAL 2 TIMES DAILY
Qty: 60 TABLET | Refills: 1 | Status: SHIPPED | OUTPATIENT
Start: 2025-07-05

## 2025-07-11 DIAGNOSIS — R73.02 IGT (IMPAIRED GLUCOSE TOLERANCE): ICD-10-CM

## 2025-07-11 DIAGNOSIS — E55.9 VITAMIN D DEFICIENCY: ICD-10-CM

## 2025-07-11 DIAGNOSIS — Z11.59 NEED FOR HEPATITIS B SCREENING TEST: ICD-10-CM

## 2025-07-11 DIAGNOSIS — I10 PRIMARY HYPERTENSION: ICD-10-CM

## 2025-07-11 DIAGNOSIS — R53.83 OTHER FATIGUE: ICD-10-CM

## 2025-07-13 LAB
25(OH)D3 SERPL-MCNC: 25.4 NG/ML (ref 30–100)
ALBUMIN SERPL-MCNC: 3.8 G/DL (ref 3.5–5)
ALBUMIN/GLOB SERPL: 1.1 (ref 1.1–2.2)
ALP SERPL-CCNC: 142 U/L (ref 45–117)
ALT SERPL-CCNC: 26 U/L (ref 12–78)
ANION GAP SERPL CALC-SCNC: 7 MMOL/L (ref 2–12)
AST SERPL-CCNC: 17 U/L (ref 15–37)
BILIRUB SERPL-MCNC: 0.5 MG/DL (ref 0.2–1)
BUN SERPL-MCNC: 14 MG/DL (ref 6–20)
BUN/CREAT SERPL: 16 (ref 12–20)
CALCIUM SERPL-MCNC: 9.2 MG/DL (ref 8.5–10.1)
CHLORIDE SERPL-SCNC: 111 MMOL/L (ref 97–108)
CO2 SERPL-SCNC: 23 MMOL/L (ref 21–32)
CREAT SERPL-MCNC: 0.85 MG/DL (ref 0.55–1.02)
ERYTHROCYTE [DISTWIDTH] IN BLOOD BY AUTOMATED COUNT: 14.6 % (ref 11.5–14.5)
EST. AVERAGE GLUCOSE BLD GHB EST-MCNC: 128 MG/DL
GLOBULIN SER CALC-MCNC: 3.5 G/DL (ref 2–4)
GLUCOSE SERPL-MCNC: 96 MG/DL (ref 65–100)
HBA1C MFR BLD: 6.1 % (ref 4–5.6)
HBV SURFACE AB SER QL: REACTIVE
HBV SURFACE AB SER-ACNC: 74.9 MIU/ML
HCT VFR BLD AUTO: 40.7 % (ref 35–47)
HGB BLD-MCNC: 12.6 G/DL (ref 11.5–16)
MCH RBC QN AUTO: 29.6 PG (ref 26–34)
MCHC RBC AUTO-ENTMCNC: 31 G/DL (ref 30–36.5)
MCV RBC AUTO: 95.5 FL (ref 80–99)
NRBC # BLD: 0 K/UL (ref 0–0.01)
NRBC BLD-RTO: 0 PER 100 WBC
PLATELET # BLD AUTO: 255 K/UL (ref 150–400)
PMV BLD AUTO: 11.9 FL (ref 8.9–12.9)
POTASSIUM SERPL-SCNC: 4 MMOL/L (ref 3.5–5.1)
PROT SERPL-MCNC: 7.3 G/DL (ref 6.4–8.2)
RBC # BLD AUTO: 4.26 M/UL (ref 3.8–5.2)
SODIUM SERPL-SCNC: 141 MMOL/L (ref 136–145)
WBC # BLD AUTO: 6.4 K/UL (ref 3.6–11)

## 2025-07-25 ENCOUNTER — HOSPITAL ENCOUNTER (OUTPATIENT)
Facility: HOSPITAL | Age: 65
Discharge: HOME | End: 2025-07-28
Attending: INTERNAL MEDICINE
Payer: COMMERCIAL

## 2025-07-25 DIAGNOSIS — R10.31 RIGHT LOWER QUADRANT ABDOMINAL PAIN: ICD-10-CM

## 2025-07-25 PROCEDURE — 74177 CT ABD & PELVIS W/CONTRAST: CPT

## 2025-07-25 PROCEDURE — 2500000003 HC RX 250 WO HCPCS: Performed by: INTERNAL MEDICINE

## 2025-07-25 PROCEDURE — 6360000004 HC RX CONTRAST MEDICATION: Performed by: INTERNAL MEDICINE

## 2025-07-25 RX ORDER — IOPAMIDOL 755 MG/ML
100 INJECTION, SOLUTION INTRAVASCULAR
Status: COMPLETED | OUTPATIENT
Start: 2025-07-25 | End: 2025-07-25

## 2025-07-25 RX ADMIN — BARIUM SULFATE 900 ML: 20 SUSPENSION ORAL at 13:22

## 2025-07-25 RX ADMIN — IOPAMIDOL 100 ML: 755 INJECTION, SOLUTION INTRAVENOUS at 13:22

## 2025-08-03 DIAGNOSIS — I10 PRIMARY HYPERTENSION: ICD-10-CM

## 2025-08-03 RX ORDER — AMLODIPINE BESYLATE 5 MG/1
5 TABLET ORAL DAILY
Qty: 90 TABLET | Refills: 0 | Status: SHIPPED | OUTPATIENT
Start: 2025-08-03

## 2025-08-26 DIAGNOSIS — H40.1130 PRIMARY OPEN ANGLE GLAUCOMA OF BOTH EYES, UNSPECIFIED GLAUCOMA STAGE: ICD-10-CM

## 2025-08-26 DIAGNOSIS — I10 PRIMARY HYPERTENSION: ICD-10-CM

## 2025-08-26 RX ORDER — ACETAZOLAMIDE 125 MG/1
125 TABLET ORAL 2 TIMES DAILY
Qty: 60 TABLET | Refills: 2 | Status: SHIPPED | OUTPATIENT
Start: 2025-08-26